# Patient Record
Sex: FEMALE | Race: ASIAN | Employment: FULL TIME | URBAN - METROPOLITAN AREA
[De-identification: names, ages, dates, MRNs, and addresses within clinical notes are randomized per-mention and may not be internally consistent; named-entity substitution may affect disease eponyms.]

---

## 2017-01-03 ENCOUNTER — OFFICE VISIT (OUTPATIENT)
Dept: INTERNAL MEDICINE CLINIC | Facility: CLINIC | Age: 42
End: 2017-01-03

## 2017-01-03 VITALS
RESPIRATION RATE: 14 BRPM | SYSTOLIC BLOOD PRESSURE: 120 MMHG | OXYGEN SATURATION: 98 % | HEIGHT: 63 IN | HEART RATE: 77 BPM | WEIGHT: 140 LBS | DIASTOLIC BLOOD PRESSURE: 72 MMHG | BODY MASS INDEX: 24.8 KG/M2

## 2017-01-03 DIAGNOSIS — D50.0 IRON DEFICIENCY ANEMIA DUE TO CHRONIC BLOOD LOSS: Primary | ICD-10-CM

## 2017-01-03 PROCEDURE — 99396 PREV VISIT EST AGE 40-64: CPT | Performed by: INTERNAL MEDICINE

## 2017-01-03 RX ORDER — ACETAMINOPHEN 160 MG
2000 TABLET,DISINTEGRATING ORAL DAILY
COMMUNITY

## 2017-01-03 NOTE — PROGRESS NOTES
[place] Debbie Mendoza [place]  10/5/1975 is a 42year old female. Patient presents with:  Physical      HPI:   See below    Current Outpatient Prescriptions:  aspirin 81 MG Oral Tab Take 81 mg by mouth daily.  Disp:  Rfl:    Vitamin D3 2000 UNITS Oral Cap T difficulty swallowing, change in stools, heartburn, nausea, vomiting no weight changes noted no heart burn noted--occ belching noted. Hematology:   Patient denies abnormal bleeding, easy bleeding, easy bruising. Enlarged lymph nodes none.    Genitourinary non-enlarged. Rebound tenderness: absent. Tenderness: absent . EXTREMITIES:   Clubbing: none. Cyanosis: absent . Edema: none. Pulses: present, bilateral.   Tremors: no.   Varicose veins: not present.    MUSCULOSKELETAL:   Cervical spines: normal

## 2017-01-13 RX ORDER — ATORVASTATIN CALCIUM 10 MG/1
TABLET, FILM COATED ORAL
Qty: 30 TABLET | Refills: 0 | Status: SHIPPED | OUTPATIENT
Start: 2017-01-13 | End: 2017-01-15

## 2017-01-16 RX ORDER — ATORVASTATIN CALCIUM 10 MG/1
TABLET, FILM COATED ORAL
Qty: 30 TABLET | Refills: 0 | Status: SHIPPED | OUTPATIENT
Start: 2017-01-16 | End: 2017-03-23

## 2017-03-23 RX ORDER — ATORVASTATIN CALCIUM 10 MG/1
TABLET, FILM COATED ORAL
Qty: 30 TABLET | Refills: 3 | Status: SHIPPED | OUTPATIENT
Start: 2017-03-23 | End: 2017-07-07

## 2017-04-05 DIAGNOSIS — D50.0 IRON DEFICIENCY ANEMIA DUE TO CHRONIC BLOOD LOSS: ICD-10-CM

## 2017-04-05 DIAGNOSIS — E78.00 PURE HYPERCHOLESTEROLEMIA: Primary | ICD-10-CM

## 2017-04-20 RX ORDER — ATORVASTATIN CALCIUM 10 MG/1
TABLET, FILM COATED ORAL
Qty: 30 TABLET | Refills: 0 | OUTPATIENT
Start: 2017-04-20

## 2017-07-07 RX ORDER — ATORVASTATIN CALCIUM 10 MG/1
TABLET, FILM COATED ORAL
Qty: 30 TABLET | Refills: 2 | Status: SHIPPED | OUTPATIENT
Start: 2017-07-07 | End: 2017-10-18

## 2017-08-15 ENCOUNTER — OFFICE VISIT (OUTPATIENT)
Dept: INTERNAL MEDICINE CLINIC | Facility: CLINIC | Age: 42
End: 2017-08-15

## 2017-08-15 ENCOUNTER — LAB ENCOUNTER (OUTPATIENT)
Dept: LAB | Age: 42
End: 2017-08-15
Attending: INTERNAL MEDICINE
Payer: COMMERCIAL

## 2017-08-15 VITALS
HEART RATE: 76 BPM | BODY MASS INDEX: 24.8 KG/M2 | RESPIRATION RATE: 16 BRPM | SYSTOLIC BLOOD PRESSURE: 114 MMHG | DIASTOLIC BLOOD PRESSURE: 70 MMHG | HEIGHT: 63 IN | WEIGHT: 140 LBS | TEMPERATURE: 98 F | OXYGEN SATURATION: 98 %

## 2017-08-15 DIAGNOSIS — R10.10 PAIN OF UPPER ABDOMEN: Primary | ICD-10-CM

## 2017-08-15 LAB
ALBUMIN SERPL-MCNC: 3.8 G/DL (ref 3.5–4.8)
ALP LIVER SERPL-CCNC: 47 U/L (ref 37–98)
ALT SERPL-CCNC: 38 U/L (ref 14–54)
AST SERPL-CCNC: 18 U/L (ref 15–41)
BASOPHILS # BLD AUTO: 0.09 X10(3) UL (ref 0–0.1)
BASOPHILS NFR BLD AUTO: 1 %
BILIRUB SERPL-MCNC: 0.4 MG/DL (ref 0.1–2)
BILIRUB UR QL STRIP.AUTO: NEGATIVE
BUN BLD-MCNC: 13 MG/DL (ref 8–20)
CALCIUM BLD-MCNC: 8.8 MG/DL (ref 8.3–10.3)
CHLORIDE: 108 MMOL/L (ref 101–111)
CO2: 26 MMOL/L (ref 22–32)
COLOR UR AUTO: YELLOW
CREAT BLD-MCNC: 0.81 MG/DL (ref 0.55–1.02)
EOSINOPHIL # BLD AUTO: 0.33 X10(3) UL (ref 0–0.3)
EOSINOPHIL NFR BLD AUTO: 3.8 %
ERYTHROCYTE [DISTWIDTH] IN BLOOD BY AUTOMATED COUNT: 12.7 % (ref 11.5–16)
GLUCOSE BLD-MCNC: 82 MG/DL (ref 70–99)
GLUCOSE UR STRIP.AUTO-MCNC: NEGATIVE MG/DL
HCT VFR BLD AUTO: 40.9 % (ref 34–50)
HGB BLD-MCNC: 13.3 G/DL (ref 12–16)
IMMATURE GRANULOCYTE COUNT: 0.02 X10(3) UL (ref 0–1)
IMMATURE GRANULOCYTE RATIO %: 0.2 %
KETONES UR STRIP.AUTO-MCNC: NEGATIVE MG/DL
LEUKOCYTE ESTERASE UR QL STRIP.AUTO: NEGATIVE
LIPASE: 344 U/L (ref 73–393)
LYMPHOCYTES # BLD AUTO: 2.17 X10(3) UL (ref 0.9–4)
LYMPHOCYTES NFR BLD AUTO: 24.7 %
M PROTEIN MFR SERPL ELPH: 7.3 G/DL (ref 6.1–8.3)
MCH RBC QN AUTO: 27 PG (ref 27–33.2)
MCHC RBC AUTO-ENTMCNC: 32.5 G/DL (ref 31–37)
MCV RBC AUTO: 83.1 FL (ref 81–100)
MONOCYTES # BLD AUTO: 0.88 X10(3) UL (ref 0.1–0.6)
MONOCYTES NFR BLD AUTO: 10 %
NEUTROPHIL ABS PRELIM: 5.28 X10 (3) UL (ref 1.3–6.7)
NEUTROPHILS # BLD AUTO: 5.28 X10(3) UL (ref 1.3–6.7)
NEUTROPHILS NFR BLD AUTO: 60.3 %
NITRITE UR QL STRIP.AUTO: NEGATIVE
PH UR STRIP.AUTO: 7 [PH] (ref 4.5–8)
PLATELET # BLD AUTO: 139 10(3)UL (ref 150–450)
POTASSIUM SERPL-SCNC: 3.6 MMOL/L (ref 3.6–5.1)
PROT UR STRIP.AUTO-MCNC: NEGATIVE MG/DL
RBC # BLD AUTO: 4.92 X10(6)UL (ref 3.8–5.1)
RBC UR QL AUTO: NEGATIVE
RED CELL DISTRIBUTION WIDTH-SD: 38.4 FL (ref 35.1–46.3)
SODIUM SERPL-SCNC: 143 MMOL/L (ref 136–144)
SP GR UR STRIP.AUTO: 1.02 (ref 1–1.03)
UROBILINOGEN UR STRIP.AUTO-MCNC: <2 MG/DL
WBC # BLD AUTO: 8.8 X10(3) UL (ref 4–13)

## 2017-08-15 PROCEDURE — 81003 URINALYSIS AUTO W/O SCOPE: CPT | Performed by: INTERNAL MEDICINE

## 2017-08-15 PROCEDURE — 99213 OFFICE O/P EST LOW 20 MIN: CPT | Performed by: INTERNAL MEDICINE

## 2017-08-15 PROCEDURE — 83690 ASSAY OF LIPASE: CPT | Performed by: INTERNAL MEDICINE

## 2017-08-15 PROCEDURE — 80053 COMPREHEN METABOLIC PANEL: CPT | Performed by: INTERNAL MEDICINE

## 2017-08-15 PROCEDURE — 36415 COLL VENOUS BLD VENIPUNCTURE: CPT | Performed by: INTERNAL MEDICINE

## 2017-08-15 PROCEDURE — 85025 COMPLETE CBC W/AUTO DIFF WBC: CPT | Performed by: INTERNAL MEDICINE

## 2017-08-15 RX ORDER — PANTOPRAZOLE SODIUM 40 MG/1
40 TABLET, DELAYED RELEASE ORAL
Qty: 30 TABLET | Refills: 11 | Status: SHIPPED | OUTPATIENT
Start: 2017-08-15 | End: 2017-09-01

## 2017-08-15 NOTE — PROGRESS NOTES
Maren Mendoza [place]  10/5/1975 is a 42year old female.     Patient presents with:  Abdominal Pain      HPI:   Abdominal pain:   The abdominal pain began few days   The severity is mild  The abdominal pain is described as gassy  The abdominal pain oc /70 (BP Location: Left arm, Patient Position: Sitting, Cuff Size: adult)   Pulse 76   Temp 98.2 °F (36.8 °C) (Oral)   Resp 16   Ht 63\"   Wt 140 lb   SpO2 98%   BMI 24.80 kg/m²     HEENT:   Ear canals: normal.   Ear drums: normal .   Ears: unremark

## 2017-08-16 ENCOUNTER — HOSPITAL ENCOUNTER (OUTPATIENT)
Dept: ULTRASOUND IMAGING | Age: 42
Discharge: HOME | End: 2017-08-16
Attending: INTERNAL MEDICINE
Payer: COMMERCIAL

## 2017-08-16 DIAGNOSIS — R10.10 PAIN OF UPPER ABDOMEN: ICD-10-CM

## 2017-08-16 PROCEDURE — 76700 US EXAM ABDOM COMPLETE: CPT | Performed by: INTERNAL MEDICINE

## 2017-08-18 ENCOUNTER — OFFICE VISIT (OUTPATIENT)
Dept: INTERNAL MEDICINE CLINIC | Facility: CLINIC | Age: 42
End: 2017-08-18

## 2017-08-18 VITALS
WEIGHT: 140 LBS | HEIGHT: 63 IN | BODY MASS INDEX: 24.8 KG/M2 | SYSTOLIC BLOOD PRESSURE: 114 MMHG | HEART RATE: 76 BPM | DIASTOLIC BLOOD PRESSURE: 72 MMHG | OXYGEN SATURATION: 98 % | TEMPERATURE: 98 F | RESPIRATION RATE: 16 BRPM

## 2017-08-18 DIAGNOSIS — R10.13 EPIGASTRIC PAIN: Primary | ICD-10-CM

## 2017-08-18 DIAGNOSIS — D69.6 THROMBOCYTOPENIA (HCC): ICD-10-CM

## 2017-08-18 PROCEDURE — 99213 OFFICE O/P EST LOW 20 MIN: CPT | Performed by: INTERNAL MEDICINE

## 2017-08-18 NOTE — PROGRESS NOTES
Gabriel Mendoza [place]  10/5/1975 is a 42year old female. Patient presents with:   Follow - Up      HPI:   Feels a lot better but still having discomfort    Current Outpatient Prescriptions:  Pantoprazole Sodium 40 MG Oral Tab EC Take 1 tablet (40 m

## 2017-08-21 ENCOUNTER — OFFICE VISIT (OUTPATIENT)
Dept: SURGERY | Facility: CLINIC | Age: 42
End: 2017-08-21

## 2017-08-21 VITALS — RESPIRATION RATE: 18 BRPM | WEIGHT: 142 LBS | TEMPERATURE: 99 F | BODY MASS INDEX: 25 KG/M2 | HEART RATE: 60 BPM

## 2017-08-21 DIAGNOSIS — R10.10 UPPER ABDOMINAL PAIN OF UNKNOWN ETIOLOGY: Primary | ICD-10-CM

## 2017-08-21 PROCEDURE — 99203 OFFICE O/P NEW LOW 30 MIN: CPT | Performed by: SURGERY

## 2017-08-21 NOTE — H&P
New Patient Visit Note       Active Problems      1.  Upper abdominal pain of unknown etiology        Chief Complaint   Patient presents with:  Abdominal Pain: new pt ref by  for Gallbladder consult       History of Present Illness     Pt seen at the req Oral Tab CR Take 160 mg by mouth daily. Disp: 180 tablet Rfl: 3   aspirin 81 MG Oral Tab Take 81 mg by mouth daily. Disp:  Rfl:    Vitamin D3 2000 UNITS Oral Cap Take 2,000 Units by mouth daily.  Disp:  Rfl:          Review of Systems  The Review of Systems encounter diagnosis)    Pt with upper abdominal pain of unclear etiology. ? Biliary dyskinesia versus GERD. Plan   · Lengthy discussion with the patient and her .   · Explained that a gallbladder polyp would not be the source of the patient's rachelle

## 2017-08-24 ENCOUNTER — HOSPITAL ENCOUNTER (OUTPATIENT)
Dept: NUCLEAR MEDICINE | Facility: HOSPITAL | Age: 42
Discharge: HOME | End: 2017-08-24
Attending: SURGERY
Payer: COMMERCIAL

## 2017-08-24 DIAGNOSIS — R10.10 UPPER ABDOMINAL PAIN OF UNKNOWN ETIOLOGY: ICD-10-CM

## 2017-08-24 PROCEDURE — 78227 HEPATOBIL SYST IMAGE W/DRUG: CPT | Performed by: SURGERY

## 2017-08-31 ENCOUNTER — LABORATORY ENCOUNTER (OUTPATIENT)
Dept: LAB | Age: 42
End: 2017-08-31
Attending: SURGERY
Payer: COMMERCIAL

## 2017-08-31 DIAGNOSIS — R10.13 EPIGASTRIC PAIN: Primary | ICD-10-CM

## 2017-08-31 PROCEDURE — 88342 IMHCHEM/IMCYTCHM 1ST ANTB: CPT

## 2017-08-31 PROCEDURE — 88305 TISSUE EXAM BY PATHOLOGIST: CPT

## 2017-09-01 ENCOUNTER — TELEPHONE (OUTPATIENT)
Dept: SURGERY | Facility: CLINIC | Age: 42
End: 2017-09-01

## 2017-09-01 DIAGNOSIS — R10.10 PAIN OF UPPER ABDOMEN: ICD-10-CM

## 2017-09-05 DIAGNOSIS — R10.10 PAIN OF UPPER ABDOMEN: ICD-10-CM

## 2017-09-05 RX ORDER — PANTOPRAZOLE SODIUM 40 MG/1
40 TABLET, DELAYED RELEASE ORAL
Qty: 30 TABLET | Refills: 5
Start: 2017-09-05 | End: 2018-09-05

## 2017-09-05 RX ORDER — PANTOPRAZOLE SODIUM 40 MG/1
40 TABLET, DELAYED RELEASE ORAL
Qty: 30 TABLET | Refills: 5 | Status: SHIPPED
Start: 2017-09-05 | End: 2018-02-13 | Stop reason: ALTCHOICE

## 2017-09-05 NOTE — TELEPHONE ENCOUNTER
From: Amanda Andino  Sent: 9/5/2017 11:50 AM CDT  Subject: Medication Renewal Request    Daja MASON Dasley would like a refill of the following medications:  Pantoprazole Sodium 40 MG Oral Tab EC Cassandra Mcgrath MD]    Preferred pharmacy: [id number] [address], [address] [phone], [phone]    Comment: This message for Dr. Fara White or Dr. Kay Cano, regarding the pantoprazole; frequency was changed to BID per Dr. Kay Cano can it be change because pharmacy wont dispense it not until Thursday.  Thank you

## 2017-09-05 NOTE — TELEPHONE ENCOUNTER
From: Tone Ellis  Sent: 9/1/2017 5:38 PM CDT  Subject: Medication Renewal Request    Daja Chaudhary would like a refill of the following medications:  Pantoprazole Sodium 40 MG Oral Tab EC Mine Jay MD]    Preferred [facility]: [facility]

## 2017-09-08 ENCOUNTER — OFFICE VISIT (OUTPATIENT)
Dept: SURGERY | Facility: CLINIC | Age: 42
End: 2017-09-08

## 2017-09-08 VITALS
HEART RATE: 69 BPM | HEIGHT: 62 IN | WEIGHT: 145 LBS | SYSTOLIC BLOOD PRESSURE: 121 MMHG | BODY MASS INDEX: 26.68 KG/M2 | TEMPERATURE: 98 F | DIASTOLIC BLOOD PRESSURE: 85 MMHG

## 2017-09-08 DIAGNOSIS — K80.10 CALCULUS OF GALLBLADDER WITH CHOLECYSTITIS WITHOUT BILIARY OBSTRUCTION, UNSPECIFIED CHOLECYSTITIS ACUITY: ICD-10-CM

## 2017-09-08 DIAGNOSIS — K82.8 BILIARY DYSKINESIA: Primary | ICD-10-CM

## 2017-09-08 PROCEDURE — 99213 OFFICE O/P EST LOW 20 MIN: CPT | Performed by: SURGERY

## 2017-09-08 NOTE — PROGRESS NOTES
Post Operative Visit Note       Active Problems  1. Biliary dyskinesia    2.  Calculus of gallbladder with cholecystitis without biliary obstruction, unspecified cholecystitis acuity         Chief Complaint   Patient presents with:  Post-Op: 8/31 EGD, here 81 mg by mouth daily. Disp:  Rfl:          Review of Systems  The Review of Systems has been reviewed by me during today. Review of Systems   Constitutional: Negative for chills, diaphoresis, fatigue, fever and unexpected weight change.    HENT: Negative f · Etiology and treatment options for biliary dyskinesia discussed with the patient and her . · Plan for a laparoscopic cholecystectomy with cholangiogram at [facility] on 9/27/17. · Procedure discussed with risks, benefits, alternatives.   ·

## 2017-09-08 NOTE — PROGRESS NOTES
Follow Up Visit Note       Active Problems      1.  Calculus of gallbladder with cholecystitis without biliary obstruction, unspecified cholecystitis acuity          Chief Complaint   Patient presents with:  Post-Op: 8/31 EGD, here for path results, protoni Constitutional: Negative for chills, diaphoresis, fatigue, fever and unexpected weight change. HENT: Negative for hearing loss, nosebleeds, sore throat and trouble swallowing. Respiratory: Negative for apnea, cough, shortness of breath and wheezing.

## 2017-09-11 RX ORDER — CHLORAL HYDRATE 500 MG
1000 CAPSULE ORAL DAILY
COMMUNITY

## 2017-09-16 LAB
ABSOLUTE BASOPHILS: 114 CELLS/UL (ref 0–200)
ABSOLUTE EOSINOPHILS: 352 CELLS/UL (ref 15–500)
ABSOLUTE LYMPHOCYTES: 2482 CELLS/UL (ref 850–3900)
ABSOLUTE MONOCYTES: 986 CELLS/UL (ref 200–950)
ABSOLUTE NEUTROPHILS: 4866 CELLS/UL (ref 1500–7800)
BASOPHILS: 1.3 %
EOSINOPHILS: 4 %
HEMATOCRIT: 39.1 % (ref 35–45)
HEMOGLOBIN: 13.4 G/DL (ref 11.7–15.5)
LYMPHOCYTES: 28.2 %
MCH: 27.7 PG (ref 27–33)
MCHC: 34.3 G/DL (ref 32–36)
MCV: 81 FL (ref 80–100)
MONOCYTES: 11.2 %
MPV: 13 FL (ref 7.5–12.5)
NEUTROPHILS: 55.3 %
PLATELET COUNT: 147 THOUSAND/UL (ref 140–400)
RDW: 12.4 % (ref 11–15)
RED BLOOD CELL COUNT: 4.83 MILLION/UL (ref 3.8–5.1)
WHITE BLOOD CELL COUNT: 8.8 THOUSAND/UL (ref 3.8–10.8)

## 2017-09-26 NOTE — H&P
Here for follow-up. EGD was negative. H pylori negative. HIDA scan showed a normal EF with reproduction of pain with CCK.      Allergies  Arturo Pastor is allergic to tetracycline.     Past Medical / Surgical / Social / Family History    The past medical and p Negative for apnea, cough, shortness of breath and wheezing. Cardiovascular: Negative for chest pain, palpitations and leg swelling.    Gastrointestinal: Negative for abdominal distention, abdominal pain, anal bleeding, blood in stool, constipation, diar conversion to open.   · All questions answered      Pre-op Diagnosis: Calculus of gallbladder with cholecystitis without biliary obstruction, unspecified cholecystitis acuity [K80.00]    The above referenced H&P was reviewed by Tirso Ware MD on 9/26/201

## 2017-09-27 ENCOUNTER — ANESTHESIA (OUTPATIENT)
Dept: SURGERY | Facility: HOSPITAL | Age: 42
End: 2017-09-27
Payer: COMMERCIAL

## 2017-09-27 ENCOUNTER — SURGERY (OUTPATIENT)
Age: 42
End: 2017-09-27

## 2017-09-27 ENCOUNTER — APPOINTMENT (OUTPATIENT)
Dept: GENERAL RADIOLOGY | Facility: HOSPITAL | Age: 42
End: 2017-09-27
Attending: SURGERY
Payer: COMMERCIAL

## 2017-09-27 ENCOUNTER — ANESTHESIA EVENT (OUTPATIENT)
Dept: SURGERY | Facility: HOSPITAL | Age: 42
End: 2017-09-27
Payer: COMMERCIAL

## 2017-09-27 ENCOUNTER — HOSPITAL ENCOUNTER (OUTPATIENT)
Facility: HOSPITAL | Age: 42
Setting detail: HOSPITAL OUTPATIENT SURGERY
Discharge: HOME | End: 2017-09-27
Attending: SURGERY | Admitting: SURGERY
Payer: COMMERCIAL

## 2017-09-27 VITALS
BODY MASS INDEX: 26.61 KG/M2 | WEIGHT: 144.63 LBS | TEMPERATURE: 98 F | HEIGHT: 62 IN | RESPIRATION RATE: 16 BRPM | HEART RATE: 67 BPM | DIASTOLIC BLOOD PRESSURE: 76 MMHG | SYSTOLIC BLOOD PRESSURE: 128 MMHG | OXYGEN SATURATION: 98 %

## 2017-09-27 DIAGNOSIS — K80.10 CALCULUS OF GALLBLADDER WITH CHOLECYSTITIS WITHOUT BILIARY OBSTRUCTION, UNSPECIFIED CHOLECYSTITIS ACUITY: ICD-10-CM

## 2017-09-27 LAB
POCT LOT NUMBER: NORMAL
POCT URINE PREGNANCY: NEGATIVE

## 2017-09-27 PROCEDURE — BF13YZZ FLUOROSCOPY OF GALLBLADDER AND BILE DUCTS USING OTHER CONTRAST: ICD-10-PCS | Performed by: SURGERY

## 2017-09-27 PROCEDURE — 88304 TISSUE EXAM BY PATHOLOGIST: CPT | Performed by: SURGERY

## 2017-09-27 PROCEDURE — 74300 X-RAY BILE DUCTS/PANCREAS: CPT | Performed by: SURGERY

## 2017-09-27 PROCEDURE — 0FT44ZZ RESECTION OF GALLBLADDER, PERCUTANEOUS ENDOSCOPIC APPROACH: ICD-10-PCS | Performed by: SURGERY

## 2017-09-27 PROCEDURE — 81025 URINE PREGNANCY TEST: CPT | Performed by: SURGERY

## 2017-09-27 RX ORDER — HYDROCODONE BITARTRATE AND ACETAMINOPHEN 5; 325 MG/1; MG/1
2 TABLET ORAL AS NEEDED
Status: COMPLETED | OUTPATIENT
Start: 2017-09-27 | End: 2017-09-27

## 2017-09-27 RX ORDER — HYDROCODONE BITARTRATE AND ACETAMINOPHEN 5; 325 MG/1; MG/1
1 TABLET ORAL AS NEEDED
Status: COMPLETED | OUTPATIENT
Start: 2017-09-27 | End: 2017-09-27

## 2017-09-27 RX ORDER — HYDROCODONE BITARTRATE AND ACETAMINOPHEN 5; 325 MG/1; MG/1
1 TABLET ORAL EVERY 4 HOURS PRN
Qty: 30 TABLET | Refills: 0 | Status: SHIPPED | OUTPATIENT
Start: 2017-09-27 | End: 2018-02-13 | Stop reason: ALTCHOICE

## 2017-09-27 RX ORDER — MIDAZOLAM HYDROCHLORIDE 1 MG/ML
1 INJECTION INTRAMUSCULAR; INTRAVENOUS EVERY 5 MIN PRN
Status: DISCONTINUED | OUTPATIENT
Start: 2017-09-27 | End: 2017-09-27

## 2017-09-27 RX ORDER — SODIUM CHLORIDE, SODIUM LACTATE, POTASSIUM CHLORIDE, CALCIUM CHLORIDE 600; 310; 30; 20 MG/100ML; MG/100ML; MG/100ML; MG/100ML
INJECTION, SOLUTION INTRAVENOUS CONTINUOUS
Status: DISCONTINUED | OUTPATIENT
Start: 2017-09-27 | End: 2017-09-27

## 2017-09-27 RX ORDER — HEPARIN SODIUM 5000 [USP'U]/ML
5000 INJECTION, SOLUTION INTRAVENOUS; SUBCUTANEOUS ONCE
Status: COMPLETED | OUTPATIENT
Start: 2017-09-27 | End: 2017-09-27

## 2017-09-27 RX ORDER — ONDANSETRON 2 MG/ML
4 INJECTION INTRAMUSCULAR; INTRAVENOUS AS NEEDED
Status: DISCONTINUED | OUTPATIENT
Start: 2017-09-27 | End: 2017-09-27

## 2017-09-27 RX ORDER — HYDROMORPHONE HYDROCHLORIDE 1 MG/ML
0.4 INJECTION, SOLUTION INTRAMUSCULAR; INTRAVENOUS; SUBCUTANEOUS EVERY 5 MIN PRN
Status: DISCONTINUED | OUTPATIENT
Start: 2017-09-27 | End: 2017-09-27

## 2017-09-27 RX ORDER — CEFOXITIN 2 G/1
INJECTION, POWDER, FOR SOLUTION INTRAVENOUS
Status: DISCONTINUED | OUTPATIENT
Start: 2017-09-27 | End: 2017-09-27 | Stop reason: HOSPADM

## 2017-09-27 RX ORDER — LABETALOL HYDROCHLORIDE 5 MG/ML
5 INJECTION, SOLUTION INTRAVENOUS EVERY 5 MIN PRN
Status: DISCONTINUED | OUTPATIENT
Start: 2017-09-27 | End: 2017-09-27

## 2017-09-27 RX ORDER — METOCLOPRAMIDE HYDROCHLORIDE 5 MG/ML
10 INJECTION INTRAMUSCULAR; INTRAVENOUS AS NEEDED
Status: DISCONTINUED | OUTPATIENT
Start: 2017-09-27 | End: 2017-09-27

## 2017-09-27 RX ORDER — MEPERIDINE HYDROCHLORIDE 25 MG/ML
12.5 INJECTION INTRAMUSCULAR; INTRAVENOUS; SUBCUTANEOUS AS NEEDED
Status: DISCONTINUED | OUTPATIENT
Start: 2017-09-27 | End: 2017-09-27

## 2017-09-27 RX ORDER — NALOXONE HYDROCHLORIDE 0.4 MG/ML
80 INJECTION, SOLUTION INTRAMUSCULAR; INTRAVENOUS; SUBCUTANEOUS AS NEEDED
Status: DISCONTINUED | OUTPATIENT
Start: 2017-09-27 | End: 2017-09-27

## 2017-09-27 RX ORDER — BUPIVACAINE HYDROCHLORIDE AND EPINEPHRINE 5; 5 MG/ML; UG/ML
INJECTION, SOLUTION EPIDURAL; INTRACAUDAL; PERINEURAL AS NEEDED
Status: DISCONTINUED | OUTPATIENT
Start: 2017-09-27 | End: 2017-09-27 | Stop reason: HOSPADM

## 2017-09-27 RX ORDER — HYDROMORPHONE HYDROCHLORIDE 1 MG/ML
INJECTION, SOLUTION INTRAMUSCULAR; INTRAVENOUS; SUBCUTANEOUS
Status: COMPLETED
Start: 2017-09-27 | End: 2017-09-27

## 2017-09-27 NOTE — OPERATIVE REPORT
PREOPERATIVE DIAGNOSIS: Bilary dyskinesia. POSTOPERATIVE DIAGNOSIS: Biliary dyskinesia.      PROCEDURE PERFORMED: Laparoscopic cholecystectomy with intraoperative cholangiogram.     SURGEON: Mariam Calixto MD       ASSISTANT: Yumiko Fox PA-C (Ms. of contrast into the duodenum, common duct, and hepatic radicles. Then, 2 clips were then placed on the proximal aspect of the duct and the duct was transected with scissors. In a similar fashion, the cystic artery was identified, clipped and divided.  The

## 2017-09-27 NOTE — ANESTHESIA PREPROCEDURE EVALUATION
PRE-OP EVALUATION    Patient Name: Odette GALO    Pre-op Diagnosis: Calculus of gallbladder with cholecystitis without biliary obstruction, unspecified cholecystitis acuity [K80.00]    Procedure(s):  [address] [place] [address] pain of unknown etiology     Biliary dyskinesia          Past Surgical History:  No date: D & C     Smoking status: Never Smoker    Smokeless tobacco: Never Used    Alcohol use Yes    Comment: social       Drug use: No     Available pre-op labs reviewed.

## 2017-09-27 NOTE — ANESTHESIA POSTPROCEDURE EVALUATION
Cindy [id number] Patient Status:  Hospital Outpatient Surgery   Age/Gender 42year old female MRN [id number]   [facility] SURGERY Attending Mann Olivera MD   Hosp Day # 0 PCP Montserrat Brower MD       Anesthesia Post

## 2017-10-06 ENCOUNTER — OFFICE VISIT (OUTPATIENT)
Dept: SURGERY | Facility: CLINIC | Age: 42
End: 2017-10-06

## 2017-10-06 VITALS
HEART RATE: 70 BPM | SYSTOLIC BLOOD PRESSURE: 109 MMHG | DIASTOLIC BLOOD PRESSURE: 69 MMHG | HEIGHT: 62 IN | WEIGHT: 147.19 LBS | TEMPERATURE: 98 F | BODY MASS INDEX: 27.08 KG/M2

## 2017-10-06 DIAGNOSIS — K82.8 BILIARY DYSKINESIA: Primary | ICD-10-CM

## 2017-10-06 PROCEDURE — 99024 POSTOP FOLLOW-UP VISIT: CPT | Performed by: SURGERY

## 2017-10-06 NOTE — PROGRESS NOTES
Post Operative Visit Note       Active Problems  1. Biliary dyskinesia         Chief Complaint   Patient presents with:  Post-Op: 1st PO LAPAROSCOPIC CHOLECYSTECTOMY WITH CHOLANGIOGRAM on 9/27 @. C/O pain/discomfort. Pt denies any discharge or bleeding. Sulfate Dried ER (SLOW FE) 160 (50 Fe) MG Oral Tab CR Take 160 mg by mouth daily. Disp: 180 tablet Rfl: 3   aspirin 81 MG Oral Tab Take 81 mg by mouth daily. Disp:  Rfl:    Vitamin D3 2000 UNITS Oral Cap Take 2,000 Units by mouth daily.  Disp:  Rfl: procedure and pathology results d/w patient and her . · Resume regular activity. · RTW on 10/11/17. · Follow-up as needed. · All questions answered. No orders of the defined types were placed in this encounter.       Imaging & Referrals

## 2017-10-20 RX ORDER — ATORVASTATIN CALCIUM 10 MG/1
10 TABLET, FILM COATED ORAL DAILY
Qty: 30 TABLET | Refills: 2 | Status: SHIPPED
Start: 2017-10-20 | End: 2018-01-22

## 2017-10-20 NOTE — TELEPHONE ENCOUNTER
From: Ghulam Andino  Sent: 10/18/2017 8:30 PM CDT  Subject: Medication Renewal Request    Daja Dominguez would like a refill of the following medications:     ATORVASTATIN 10 MG Oral Tab Rachel Mariano MD]    Preferred pharmacy: [facility]

## 2018-01-23 RX ORDER — ATORVASTATIN CALCIUM 10 MG/1
TABLET, FILM COATED ORAL
Qty: 90 TABLET | Refills: 1 | Status: SHIPPED | OUTPATIENT
Start: 2018-01-23 | End: 2018-07-07

## 2018-02-05 ENCOUNTER — TELEPHONE (OUTPATIENT)
Dept: INTERNAL MEDICINE CLINIC | Facility: CLINIC | Age: 43
End: 2018-02-05

## 2018-02-05 DIAGNOSIS — E78.00 PURE HYPERCHOLESTEROLEMIA: ICD-10-CM

## 2018-02-05 DIAGNOSIS — Z00.00 BLOOD TESTS FOR ROUTINE GENERAL PHYSICAL EXAMINATION: Primary | ICD-10-CM

## 2018-02-05 NOTE — TELEPHONE ENCOUNTER
Patient has an appointment on 02/09/18, and is requesting her lab work orders to be inputted in prior to her appointment.     Lab Preference: [facility]

## 2018-02-07 ENCOUNTER — APPOINTMENT (OUTPATIENT)
Dept: LAB | Facility: HOSPITAL | Age: 43
End: 2018-02-07
Attending: INTERNAL MEDICINE
Payer: COMMERCIAL

## 2018-02-07 LAB
25-HYDROXYVITAMIN D (TOTAL): 41.1 NG/ML (ref 30–100)
ALBUMIN SERPL-MCNC: 4.1 G/DL (ref 3.5–4.8)
ALP LIVER SERPL-CCNC: 52 U/L (ref 37–98)
ALT SERPL-CCNC: 34 U/L (ref 14–54)
AST SERPL-CCNC: 19 U/L (ref 15–41)
BASOPHILS # BLD AUTO: 0.07 X10(3) UL (ref 0–0.1)
BASOPHILS NFR BLD AUTO: 1.1 %
BILIRUB SERPL-MCNC: 0.7 MG/DL (ref 0.1–2)
BILIRUB UR QL STRIP.AUTO: NEGATIVE
BUN BLD-MCNC: 11 MG/DL (ref 8–20)
CALCIUM BLD-MCNC: 8.8 MG/DL (ref 8.3–10.3)
CHLORIDE: 106 MMOL/L (ref 101–111)
CHOLEST SMN-MCNC: 151 MG/DL (ref ?–200)
CLARITY UR REFRACT.AUTO: CLEAR
CO2: 27 MMOL/L (ref 22–32)
COLOR UR AUTO: YELLOW
CREAT BLD-MCNC: 0.66 MG/DL (ref 0.55–1.02)
EOSINOPHIL # BLD AUTO: 0.25 X10(3) UL (ref 0–0.3)
EOSINOPHIL NFR BLD AUTO: 3.9 %
ERYTHROCYTE [DISTWIDTH] IN BLOOD BY AUTOMATED COUNT: 12.9 % (ref 11.5–16)
EST. AVERAGE GLUCOSE BLD GHB EST-MCNC: 111 MG/DL (ref 68–126)
GLUCOSE BLD-MCNC: 82 MG/DL (ref 70–99)
GLUCOSE UR STRIP.AUTO-MCNC: NEGATIVE MG/DL
HBA1C MFR BLD HPLC: 5.5 % (ref ?–5.7)
HCT VFR BLD AUTO: 45.3 % (ref 34–50)
HDLC SERPL-MCNC: 73 MG/DL (ref 45–?)
HDLC SERPL: 2.07 {RATIO} (ref ?–4.44)
HGB BLD-MCNC: 14.8 G/DL (ref 12–16)
IMMATURE GRANULOCYTE COUNT: 0.02 X10(3) UL (ref 0–1)
IMMATURE GRANULOCYTE RATIO %: 0.3 %
KETONES UR STRIP.AUTO-MCNC: NEGATIVE MG/DL
LDLC SERPL CALC-MCNC: 70 MG/DL (ref ?–130)
LEUKOCYTE ESTERASE UR QL STRIP.AUTO: NEGATIVE
LYMPHOCYTES # BLD AUTO: 1.82 X10(3) UL (ref 0.9–4)
LYMPHOCYTES NFR BLD AUTO: 28.3 %
M PROTEIN MFR SERPL ELPH: 7.8 G/DL (ref 6.1–8.3)
MCH RBC QN AUTO: 27 PG (ref 27–33.2)
MCHC RBC AUTO-ENTMCNC: 32.7 G/DL (ref 31–37)
MCV RBC AUTO: 82.7 FL (ref 81–100)
MONOCYTES # BLD AUTO: 0.53 X10(3) UL (ref 0.1–0.6)
MONOCYTES NFR BLD AUTO: 8.2 %
NEUTROPHIL ABS PRELIM: 3.75 X10 (3) UL (ref 1.3–6.7)
NEUTROPHILS # BLD AUTO: 3.75 X10(3) UL (ref 1.3–6.7)
NEUTROPHILS NFR BLD AUTO: 58.2 %
NITRITE UR QL STRIP.AUTO: NEGATIVE
NONHDLC SERPL-MCNC: 78 MG/DL (ref ?–130)
PH UR STRIP.AUTO: 6 [PH] (ref 4.5–8)
PLATELET # BLD AUTO: 151 10(3)UL (ref 150–450)
POTASSIUM SERPL-SCNC: 3.7 MMOL/L (ref 3.6–5.1)
PROT UR STRIP.AUTO-MCNC: NEGATIVE MG/DL
RBC # BLD AUTO: 5.48 X10(6)UL (ref 3.8–5.1)
RBC UR QL AUTO: NEGATIVE
RED CELL DISTRIBUTION WIDTH-SD: 38.8 FL (ref 35.1–46.3)
SODIUM SERPL-SCNC: 140 MMOL/L (ref 136–144)
SP GR UR STRIP.AUTO: 1.02 (ref 1–1.03)
THYROXINE (T4): 10 UG/DL (ref 4.5–10.9)
TRIGL SERPL-MCNC: 40 MG/DL (ref ?–150)
UROBILINOGEN UR STRIP.AUTO-MCNC: <2 MG/DL
VLDLC SERPL CALC-MCNC: 8 MG/DL (ref 5–40)
WBC # BLD AUTO: 6.4 X10(3) UL (ref 4–13)

## 2018-02-07 PROCEDURE — 80061 LIPID PANEL: CPT | Performed by: INTERNAL MEDICINE

## 2018-02-07 PROCEDURE — 81003 URINALYSIS AUTO W/O SCOPE: CPT | Performed by: INTERNAL MEDICINE

## 2018-02-07 PROCEDURE — 82306 VITAMIN D 25 HYDROXY: CPT | Performed by: INTERNAL MEDICINE

## 2018-02-07 PROCEDURE — 83036 HEMOGLOBIN GLYCOSYLATED A1C: CPT | Performed by: INTERNAL MEDICINE

## 2018-02-07 PROCEDURE — 80053 COMPREHEN METABOLIC PANEL: CPT | Performed by: INTERNAL MEDICINE

## 2018-02-07 PROCEDURE — 84436 ASSAY OF TOTAL THYROXINE: CPT | Performed by: INTERNAL MEDICINE

## 2018-02-07 PROCEDURE — 85025 COMPLETE CBC W/AUTO DIFF WBC: CPT | Performed by: INTERNAL MEDICINE

## 2018-02-13 ENCOUNTER — OFFICE VISIT (OUTPATIENT)
Dept: INTERNAL MEDICINE CLINIC | Facility: CLINIC | Age: 43
End: 2018-02-13

## 2018-02-13 VITALS
OXYGEN SATURATION: 94 % | RESPIRATION RATE: 16 BRPM | DIASTOLIC BLOOD PRESSURE: 80 MMHG | SYSTOLIC BLOOD PRESSURE: 132 MMHG | HEART RATE: 74 BPM | TEMPERATURE: 98 F | WEIGHT: 148 LBS | BODY MASS INDEX: 26.22 KG/M2 | HEIGHT: 63 IN

## 2018-02-13 DIAGNOSIS — Z00.00 ROUTINE GENERAL MEDICAL EXAMINATION AT A HEALTH CARE FACILITY: Primary | ICD-10-CM

## 2018-02-13 PROBLEM — D50.0 IRON DEFICIENCY ANEMIA DUE TO CHRONIC BLOOD LOSS: Status: RESOLVED | Noted: 2017-01-03 | Resolved: 2018-02-13

## 2018-02-13 PROBLEM — R10.10 UPPER ABDOMINAL PAIN OF UNKNOWN ETIOLOGY: Status: RESOLVED | Noted: 2017-08-21 | Resolved: 2018-02-13

## 2018-02-13 PROBLEM — K82.8 BILIARY DYSKINESIA: Status: RESOLVED | Noted: 2017-09-08 | Resolved: 2018-02-13

## 2018-02-13 PROCEDURE — 99396 PREV VISIT EST AGE 40-64: CPT | Performed by: INTERNAL MEDICINE

## 2018-02-13 NOTE — PROGRESS NOTES
Tres Mendoza [place]  10/5/1975 is a 36year old female.     Patient presents with:  Physical      HPI:   See below    Current Outpatient Prescriptions:  ATORVASTATIN 10 MG Oral Tab TAKE 1 TABLET BY MOUTH ONE TIME A DAY  Disp: 90 tablet Rfl: 1   omega dyspnea) none.  exercise yes  Gastrointestinal:   Patient denies abdominal pain, blood in stool, constipation, diarrhea, difficulty swallowing, change in stools, heartburn, nausea, vomiting no weight changes noted no heart burn noted- --s/p danii 9/17-after Rhythm: regular. LUNGS:   Auscultation: clear . Chest Shape: normal .   Percussion: normal.   Rales: no .   Respiratory effort: normal .   Rhonchi: no.   Wheezes: no. ABDOMEN:   General: normal.   Hernia: absent. Inguinal nodes: none.    Liver, Sp

## 2018-02-13 NOTE — PATIENT INSTRUCTIONS
Will review to treat about postcholecystectomy bowel alteration. May land up needing cholestyramine.   However will review records and get back to patient

## 2018-02-22 ENCOUNTER — TELEPHONE (OUTPATIENT)
Dept: INTERNAL MEDICINE CLINIC | Facility: CLINIC | Age: 43
End: 2018-02-22

## 2018-02-22 RX ORDER — CHOLESTYRAMINE 4 G/9G
2 POWDER, FOR SUSPENSION ORAL DAILY
Qty: 15 PACKET | Refills: 3 | Status: SHIPPED | OUTPATIENT
Start: 2018-02-22 | End: 2018-03-24

## 2018-02-22 NOTE — TELEPHONE ENCOUNTER
Patient no records reviewed after cholecystectomy a small percentage of people can get diarrhea which should resolve over.   Of time ,however if she wants she can get a trial of cholestyramine 2 g daily which can gradually be increased upwards if no better

## 2018-05-01 ENCOUNTER — HOSPITAL ENCOUNTER (OUTPATIENT)
Dept: GENERAL RADIOLOGY | Age: 43
Discharge: HOME | End: 2018-05-01
Attending: INTERNAL MEDICINE
Payer: COMMERCIAL

## 2018-05-01 ENCOUNTER — OFFICE VISIT (OUTPATIENT)
Dept: INTERNAL MEDICINE CLINIC | Facility: CLINIC | Age: 43
End: 2018-05-01

## 2018-05-01 VITALS
DIASTOLIC BLOOD PRESSURE: 60 MMHG | SYSTOLIC BLOOD PRESSURE: 100 MMHG | HEIGHT: 63 IN | TEMPERATURE: 99 F | OXYGEN SATURATION: 97 % | WEIGHT: 149 LBS | RESPIRATION RATE: 16 BRPM | HEART RATE: 77 BPM | BODY MASS INDEX: 26.4 KG/M2

## 2018-05-01 DIAGNOSIS — G89.29 CHRONIC PAIN OF RIGHT KNEE: Primary | ICD-10-CM

## 2018-05-01 DIAGNOSIS — M25.561 CHRONIC PAIN OF RIGHT KNEE: Primary | ICD-10-CM

## 2018-05-01 DIAGNOSIS — M25.561 CHRONIC PAIN OF RIGHT KNEE: ICD-10-CM

## 2018-05-01 DIAGNOSIS — G89.29 CHRONIC PAIN OF RIGHT KNEE: ICD-10-CM

## 2018-05-01 PROCEDURE — 99213 OFFICE O/P EST LOW 20 MIN: CPT | Performed by: INTERNAL MEDICINE

## 2018-05-01 PROCEDURE — 73560 X-RAY EXAM OF KNEE 1 OR 2: CPT | Performed by: INTERNAL MEDICINE

## 2018-05-01 NOTE — PROGRESS NOTES
Mony Lain Reji [place]  10/5/1975 is a 36year old female.     Patient presents with:  Knee Pain      HPI:    Knee:   Presents with c/o Pain in right knee especially on prolonged standing  Swelling neg  Locking and catching neg    Giving way sensation n today for knee pain. Diagnoses and all orders for this visit:    Chronic pain of right knee  -     XR KNEE (1 OR 2 VIEWS), RIGHT (CPT=73560); Future  -     Diclofenac Sodium 50 MG Oral Tab EC; Take 1 tablet (50 mg total) by mouth 2 (two) times daily.

## 2018-07-07 ENCOUNTER — OFFICE VISIT (OUTPATIENT)
Dept: INTERNAL MEDICINE CLINIC | Facility: CLINIC | Age: 43
End: 2018-07-07

## 2018-07-07 VITALS
DIASTOLIC BLOOD PRESSURE: 70 MMHG | SYSTOLIC BLOOD PRESSURE: 110 MMHG | HEIGHT: 63 IN | HEART RATE: 64 BPM | BODY MASS INDEX: 26.89 KG/M2 | RESPIRATION RATE: 16 BRPM | TEMPERATURE: 99 F | WEIGHT: 151.75 LBS

## 2018-07-07 DIAGNOSIS — R12 HEARTBURN: Primary | ICD-10-CM

## 2018-07-07 DIAGNOSIS — K29.60 BILIARY GASTRITIS: ICD-10-CM

## 2018-07-07 LAB
ALBUMIN SERPL-MCNC: 4 G/DL (ref 3.5–4.8)
ALP LIVER SERPL-CCNC: 54 U/L (ref 37–98)
ALT SERPL-CCNC: 39 U/L (ref 14–54)
AST SERPL-CCNC: 21 U/L (ref 15–41)
BASOPHILS # BLD AUTO: 0.12 X10(3) UL (ref 0–0.1)
BASOPHILS NFR BLD AUTO: 1.4 %
BILIRUB SERPL-MCNC: 0.4 MG/DL (ref 0.1–2)
BUN BLD-MCNC: 11 MG/DL (ref 8–20)
CALCIUM BLD-MCNC: 8.7 MG/DL (ref 8.3–10.3)
CHLORIDE: 108 MMOL/L (ref 101–111)
CO2: 24 MMOL/L (ref 22–32)
CREAT BLD-MCNC: 0.66 MG/DL (ref 0.55–1.02)
EOSINOPHIL # BLD AUTO: 0.35 X10(3) UL (ref 0–0.3)
EOSINOPHIL NFR BLD AUTO: 4.1 %
ERYTHROCYTE [DISTWIDTH] IN BLOOD BY AUTOMATED COUNT: 13.2 % (ref 11.5–16)
GLUCOSE BLD-MCNC: 79 MG/DL (ref 70–99)
HCT VFR BLD AUTO: 44.2 % (ref 34–50)
HGB BLD-MCNC: 13.9 G/DL (ref 12–16)
IMMATURE GRANULOCYTE COUNT: 0.02 X10(3) UL (ref 0–1)
IMMATURE GRANULOCYTE RATIO %: 0.2 %
LIPASE: 239 U/L (ref 73–393)
LYMPHOCYTES # BLD AUTO: 2.37 X10(3) UL (ref 0.9–4)
LYMPHOCYTES NFR BLD AUTO: 27.5 %
M PROTEIN MFR SERPL ELPH: 7.9 G/DL (ref 6.1–8.3)
MCH RBC QN AUTO: 26.5 PG (ref 27–33.2)
MCHC RBC AUTO-ENTMCNC: 31.4 G/DL (ref 31–37)
MCV RBC AUTO: 84.4 FL (ref 81–100)
MONOCYTES # BLD AUTO: 0.9 X10(3) UL (ref 0.1–1)
MONOCYTES NFR BLD AUTO: 10.5 %
NEUTROPHIL ABS PRELIM: 4.85 X10 (3) UL (ref 1.3–6.7)
NEUTROPHILS # BLD AUTO: 4.85 X10(3) UL (ref 1.3–6.7)
NEUTROPHILS NFR BLD AUTO: 56.3 %
PLATELET # BLD AUTO: 180 10(3)UL (ref 150–450)
POTASSIUM SERPL-SCNC: 3.9 MMOL/L (ref 3.6–5.1)
RBC # BLD AUTO: 5.24 X10(6)UL (ref 3.8–5.1)
RED CELL DISTRIBUTION WIDTH-SD: 40.9 FL (ref 35.1–46.3)
SODIUM SERPL-SCNC: 141 MMOL/L (ref 136–144)
WBC # BLD AUTO: 8.6 X10(3) UL (ref 4–13)

## 2018-07-07 PROCEDURE — 99213 OFFICE O/P EST LOW 20 MIN: CPT | Performed by: INTERNAL MEDICINE

## 2018-07-07 PROCEDURE — 83690 ASSAY OF LIPASE: CPT | Performed by: INTERNAL MEDICINE

## 2018-07-07 PROCEDURE — 85025 COMPLETE CBC W/AUTO DIFF WBC: CPT | Performed by: INTERNAL MEDICINE

## 2018-07-07 PROCEDURE — 80053 COMPREHEN METABOLIC PANEL: CPT | Performed by: INTERNAL MEDICINE

## 2018-07-07 RX ORDER — PANTOPRAZOLE SODIUM 40 MG/1
40 TABLET, DELAYED RELEASE ORAL
Qty: 30 TABLET | Refills: 11 | Status: SHIPPED | OUTPATIENT
Start: 2018-07-07 | End: 2019-02-19

## 2018-07-07 RX ORDER — ATORVASTATIN CALCIUM 10 MG/1
TABLET, FILM COATED ORAL
Qty: 90 TABLET | Refills: 1 | Status: SHIPPED | OUTPATIENT
Start: 2018-07-07 | End: 2018-12-26

## 2018-07-07 NOTE — PROGRESS NOTES
Rosana Tirado Reji [place]  10/5/1975 is a 36year old female. Patient presents with:  Heartburn  Hand Pain: R hand joint pain       HPI:   Heartburn in the last few weeks symptoms are gotten worse after the gallbladder surgery.   Had symptoms even prior /70 (BP Location: Right arm, Patient Position: Sitting, Cuff Size: adult)   Pulse 64   Temp 98.6 °F (37 °C) (Oral)   Resp 16   Ht 63\"   Wt 151 lb 12 oz   LMP 06/15/2018   BMI 26.88 kg/m²     HEENT:   Ear canals: normal.   Ear drums: normal .   Ears: Symptoms of gastritis can include:  · Abdominal pain or bloating  · Loss of appetite  · Nausea or vomiting  · Vomiting blood or having black stools  · Feeling more tired than usual  An inflamed and irritated stomach lining is more likely to develop a sore © 9146-8198 [employer] [address]. [address] [facility], [address]. All rights reserved. This information is not intended as a substitute for professional medical care. Always follow your healthcare professional's instructions.         Did rev

## 2018-07-12 ENCOUNTER — APPOINTMENT (OUTPATIENT)
Dept: LAB | Age: 43
End: 2018-07-12
Attending: INTERNAL MEDICINE
Payer: COMMERCIAL

## 2018-07-12 PROCEDURE — 87338 HPYLORI STOOL AG IA: CPT | Performed by: INTERNAL MEDICINE

## 2018-07-18 ENCOUNTER — TELEPHONE (OUTPATIENT)
Dept: INTERNAL MEDICINE CLINIC | Facility: CLINIC | Age: 43
End: 2018-07-18

## 2018-12-27 RX ORDER — ATORVASTATIN CALCIUM 10 MG/1
TABLET, FILM COATED ORAL
Qty: 90 TABLET | Refills: 0 | Status: SHIPPED | OUTPATIENT
Start: 2018-12-27 | End: 2019-04-13

## 2018-12-27 NOTE — TELEPHONE ENCOUNTER
Refill requested:   Requested Prescriptions     Pending Prescriptions Disp Refills   • ATORVASTATIN 10 MG Oral Tab [Pharmacy Med Name: Atorvastatin Calcium Oral Tablet 10 MG] 90 tablet 0     Sig: TAKE 1 TABLET BY MOUTH ONE TIME A DAY         Passed protoco

## 2018-12-29 RX ORDER — ATORVASTATIN CALCIUM 10 MG/1
TABLET, FILM COATED ORAL
Qty: 90 TABLET | Refills: 0 | OUTPATIENT
Start: 2018-12-29

## 2019-02-19 ENCOUNTER — OFFICE VISIT (OUTPATIENT)
Dept: INTERNAL MEDICINE CLINIC | Facility: CLINIC | Age: 44
End: 2019-02-19
Payer: COMMERCIAL

## 2019-02-19 VITALS
TEMPERATURE: 98 F | WEIGHT: 142.25 LBS | DIASTOLIC BLOOD PRESSURE: 82 MMHG | OXYGEN SATURATION: 98 % | HEIGHT: 62 IN | HEART RATE: 82 BPM | SYSTOLIC BLOOD PRESSURE: 120 MMHG | BODY MASS INDEX: 26.18 KG/M2 | RESPIRATION RATE: 12 BRPM

## 2019-02-19 DIAGNOSIS — Z00.00 ROUTINE GENERAL MEDICAL EXAMINATION AT A HEALTH CARE FACILITY: Primary | ICD-10-CM

## 2019-02-19 PROBLEM — R12 HEARTBURN: Status: RESOLVED | Noted: 2018-07-07 | Resolved: 2019-02-19

## 2019-02-19 PROBLEM — K29.60 BILIARY GASTRITIS: Status: RESOLVED | Noted: 2018-07-07 | Resolved: 2019-02-19

## 2019-02-19 PROCEDURE — 99396 PREV VISIT EST AGE 40-64: CPT | Performed by: INTERNAL MEDICINE

## 2019-02-19 NOTE — PROGRESS NOTES
Yuliya Disla [facility]  10/5/1975 is a 39year old female.     Patient presents with:  Physical      HPI:   See below    Current Outpatient Medications:  ATORVASTATIN 10 MG Oral Tab TAKE 1 TABLET BY MOUTH ONE TIME A DAY  Disp: 90 tablet Rfl: 0   omega-3 in stools, heartburn, nausea, vomiting no weight changes noted no heart burn noted- --s/p cholalanna 9/17-after surgery the patient started developing some bowel movements especially after breakfast and  liquid --- the rest of the day the bowel movements are un Rhonchi: no.   Wheezes: no. ABDOMEN:   General: normal.   Hernia: absent. Inguinal nodes: none. Liver, Spleen: non-enlarged. Rebound tenderness: absent. Tenderness: absent . EXTREMITIES:   Clubbing: none. Cyanosis: absent . Edema: none.

## 2019-02-22 LAB
ABSOLUTE BASOPHILS: 104 CELLS/UL (ref 0–200)
ABSOLUTE EOSINOPHILS: 280 CELLS/UL (ref 15–500)
ABSOLUTE LYMPHOCYTES: 2424 CELLS/UL (ref 850–3900)
ABSOLUTE MONOCYTES: 712 CELLS/UL (ref 200–950)
ABSOLUTE NEUTROPHILS: 4480 CELLS/UL (ref 1500–7800)
ALBUMIN/GLOBULIN RATIO: 1.8 (CALC) (ref 1–2.5)
ALBUMIN: 4.4 G/DL (ref 3.6–5.1)
ALKALINE PHOSPHATASE: 38 U/L (ref 33–115)
ALT: 17 U/L (ref 6–29)
APPEARANCE: CLEAR
AST: 15 U/L (ref 10–30)
BASOPHILS: 1.3 %
BILIRUBIN, TOTAL: 0.6 MG/DL (ref 0.2–1.2)
BILIRUBIN: NEGATIVE
BUN: 11 MG/DL (ref 7–25)
CALCIUM: 8.7 MG/DL (ref 8.6–10.2)
CARBON DIOXIDE: 27 MMOL/L (ref 20–32)
CHLORIDE: 107 MMOL/L (ref 98–110)
CHOL/HDLC RATIO: 2.6 (CALC)
CHOLESTEROL, TOTAL: 164 MG/DL
COLOR: YELLOW
CREATININE: 0.56 MG/DL (ref 0.5–1.1)
EGFR IF AFRICN AM: 131 ML/MIN/1.73M2
EGFR IF NONAFRICN AM: 113 ML/MIN/1.73M2
EOSINOPHILS: 3.5 %
GLOBULIN: 2.5 G/DL (CALC) (ref 1.9–3.7)
GLUCOSE: 85 MG/DL (ref 65–99)
GLUCOSE: NEGATIVE
HDL CHOLESTEROL: 62 MG/DL
HEMATOCRIT: 39.7 % (ref 35–45)
HEMOGLOBIN A1C: 5.4 % OF TOTAL HGB
HEMOGLOBIN: 13.1 G/DL (ref 11.7–15.5)
KETONES: NEGATIVE
LDL-CHOLESTEROL: 88 MG/DL (CALC)
LEUKOCYTE ESTERASE: NEGATIVE
LYMPHOCYTES: 30.3 %
MCH: 27 PG (ref 27–33)
MCHC: 33 G/DL (ref 32–36)
MCV: 81.7 FL (ref 80–100)
MONOCYTES: 8.9 %
MPV: 13 FL (ref 7.5–12.5)
NEUTROPHILS: 56 %
NITRITE: NEGATIVE
NON-HDL CHOLESTEROL: 102 MG/DL (CALC)
OCCULT BLOOD: NEGATIVE
PH: 6 (ref 5–8)
PLATELET COUNT: 171 THOUSAND/UL (ref 140–400)
POTASSIUM: 3.7 MMOL/L (ref 3.5–5.3)
PROTEIN, TOTAL: 6.9 G/DL (ref 6.1–8.1)
PROTEIN: NEGATIVE
RDW: 12.8 % (ref 11–15)
RED BLOOD CELL COUNT: 4.86 MILLION/UL (ref 3.8–5.1)
SODIUM: 139 MMOL/L (ref 135–146)
SPECIFIC GRAVITY: 1.02 (ref 1–1.03)
T4 (THYROXINE), TOTAL: 8 MCG/DL (ref 5.1–11.9)
TRIGLYCERIDES: 49 MG/DL
TSH: 1.07 MIU/L
WHITE BLOOD CELL COUNT: 8 THOUSAND/UL (ref 3.8–10.8)

## 2019-04-15 RX ORDER — ATORVASTATIN CALCIUM 10 MG/1
TABLET, FILM COATED ORAL
Qty: 90 TABLET | Refills: 0 | Status: SHIPPED | OUTPATIENT
Start: 2019-04-15 | End: 2019-07-21

## 2019-04-15 NOTE — TELEPHONE ENCOUNTER
Protocol passed.      Medication(s) to Refill:   Requested Prescriptions     Pending Prescriptions Disp Refills   • ATORVASTATIN 10 MG Oral Tab [Pharmacy Med Name: Atorvastatin Calcium Oral Tablet 10 MG] 90 tablet 0     Sig: TAKE 1 TABLET BY MOUTH ONE TIME

## 2019-06-25 ENCOUNTER — OFFICE VISIT (OUTPATIENT)
Dept: INTERNAL MEDICINE CLINIC | Facility: CLINIC | Age: 44
End: 2019-06-25
Payer: COMMERCIAL

## 2019-06-25 VITALS
HEIGHT: 62 IN | DIASTOLIC BLOOD PRESSURE: 66 MMHG | BODY MASS INDEX: 26.13 KG/M2 | TEMPERATURE: 98 F | OXYGEN SATURATION: 99 % | WEIGHT: 142 LBS | HEART RATE: 73 BPM | RESPIRATION RATE: 12 BRPM | SYSTOLIC BLOOD PRESSURE: 102 MMHG

## 2019-06-25 DIAGNOSIS — G89.29 CHRONIC RIGHT-SIDED LOW BACK PAIN WITH RIGHT-SIDED SCIATICA: Primary | ICD-10-CM

## 2019-06-25 DIAGNOSIS — M54.41 CHRONIC RIGHT-SIDED LOW BACK PAIN WITH RIGHT-SIDED SCIATICA: Primary | ICD-10-CM

## 2019-06-25 PROCEDURE — 99214 OFFICE O/P EST MOD 30 MIN: CPT | Performed by: INTERNAL MEDICINE

## 2019-06-25 RX ORDER — METHYLPREDNISOLONE 4 MG/1
TABLET ORAL
Qty: 1 KIT | Refills: 0 | Status: SHIPPED | OUTPATIENT
Start: 2019-06-25 | End: 2019-10-15

## 2019-06-25 RX ORDER — CYCLOBENZAPRINE HCL 5 MG
5 TABLET ORAL NIGHTLY
Qty: 30 TABLET | Refills: 0 | Status: SHIPPED | OUTPATIENT
Start: 2019-06-25 | End: 2019-10-15

## 2019-06-25 NOTE — PATIENT INSTRUCTIONS
Back exercise sheet given. If no better patient will consider physical therapy.   Pending MRI pain service

## 2019-06-25 NOTE — PROGRESS NOTES
Edith Mendoza [place]  10/5/1975 is a 39year old female. Patient presents with:  Back Pain      HPI:   C/o of low back pain for few days. Patient has a history of long-standing low back pain.   However in the last few weeks symptoms gotten a littl no.   Genitourinary:   Loss of control of urine no. Recurrent Urinary Tract Infection (UTI) no . Abnormal menstrual bleeding No. Blood in urine no. Burning on urination no. Difficulty urinating no. Discharge none. Dysuria none. Flank pain no.  Frequent Nigh reviewed  Patient Instructions   Back exercise sheet given. If no better patient will consider physical therapy. Pending MRI pain service     The patient indicates understanding of these issues and agrees to the plan.   The patient is asked to return in R

## 2019-07-23 RX ORDER — ATORVASTATIN CALCIUM 10 MG/1
TABLET, FILM COATED ORAL
Qty: 90 TABLET | Refills: 0 | Status: SHIPPED | OUTPATIENT
Start: 2019-07-23 | End: 2019-10-15

## 2019-07-23 NOTE — TELEPHONE ENCOUNTER
Atorvastatin 10 mg    Last OV relevant to medication: 6-     Last refill date: 4-     When pt was asked to return for OV: 2 weeks     Upcoming appt/reason: none    Labs: 2- ( lipid, cmp, cbc)

## 2019-10-15 DIAGNOSIS — G89.29 CHRONIC RIGHT-SIDED LOW BACK PAIN WITH RIGHT-SIDED SCIATICA: ICD-10-CM

## 2019-10-15 DIAGNOSIS — M54.41 CHRONIC RIGHT-SIDED LOW BACK PAIN WITH RIGHT-SIDED SCIATICA: ICD-10-CM

## 2019-10-17 RX ORDER — METHYLPREDNISOLONE 4 MG/1
TABLET ORAL
Qty: 1 PACKAGE | Refills: 0 | Status: SHIPPED | OUTPATIENT
Start: 2019-10-17 | End: 2020-02-25 | Stop reason: ALTCHOICE

## 2019-10-17 RX ORDER — CYCLOBENZAPRINE HCL 5 MG
TABLET ORAL
Qty: 30 TABLET | Refills: 0 | Status: SHIPPED | OUTPATIENT
Start: 2019-10-17 | End: 2020-02-25 | Stop reason: ALTCHOICE

## 2019-10-17 RX ORDER — ATORVASTATIN CALCIUM 10 MG/1
TABLET, FILM COATED ORAL
Qty: 90 TABLET | Refills: 0 | Status: SHIPPED | OUTPATIENT
Start: 2019-10-17 | End: 2020-02-03

## 2019-10-17 NOTE — TELEPHONE ENCOUNTER
ATORVASTATIN 10 MG , CYCLOBENZAPRINE HCL 5 MG , and METHYLPREDNISOLONE 4 MG Oral     Last OV relevant to medication: 6-     Last refill QA:6- # 90 tabs with 0 refills     When pt was asked to return for OV: 2 weeks     Upcoming appt/reason:

## 2019-10-19 RX ORDER — ATORVASTATIN CALCIUM 10 MG/1
TABLET, FILM COATED ORAL
Qty: 90 TABLET | Refills: 0 | OUTPATIENT
Start: 2019-10-19

## 2019-10-26 RX ORDER — ATORVASTATIN CALCIUM 10 MG/1
TABLET, FILM COATED ORAL
Qty: 90 TABLET | Refills: 0 | OUTPATIENT
Start: 2019-10-26

## 2020-02-04 RX ORDER — ATORVASTATIN CALCIUM 10 MG/1
TABLET, FILM COATED ORAL
Qty: 90 TABLET | Refills: 0 | Status: SHIPPED | OUTPATIENT
Start: 2020-02-04 | End: 2020-02-25

## 2020-02-04 NOTE — TELEPHONE ENCOUNTER
Passed protocol    Requesting ATORVASTATIN 10 MG Oral Tab  LOV: 6/25/19  RTC: 2 weeks  Last Relevant Labs: 2/21/19  Filled: 10/17/19 #90 with 0 refills    Future Appointments   Date Time Provider Edgar Jo   2/25/2020  9:00 AM Juana Rodas MD EM

## 2020-02-25 ENCOUNTER — OFFICE VISIT (OUTPATIENT)
Dept: INTERNAL MEDICINE CLINIC | Facility: CLINIC | Age: 45
End: 2020-02-25
Payer: COMMERCIAL

## 2020-02-25 ENCOUNTER — APPOINTMENT (OUTPATIENT)
Dept: LAB | Age: 45
End: 2020-02-25
Attending: INTERNAL MEDICINE
Payer: COMMERCIAL

## 2020-02-25 VITALS
HEART RATE: 83 BPM | RESPIRATION RATE: 16 BRPM | WEIGHT: 144 LBS | BODY MASS INDEX: 26.5 KG/M2 | DIASTOLIC BLOOD PRESSURE: 62 MMHG | HEIGHT: 62 IN | TEMPERATURE: 98 F | OXYGEN SATURATION: 99 % | SYSTOLIC BLOOD PRESSURE: 124 MMHG

## 2020-02-25 DIAGNOSIS — E78.00 PURE HYPERCHOLESTEROLEMIA: ICD-10-CM

## 2020-02-25 DIAGNOSIS — Z00.00 ROUTINE GENERAL MEDICAL EXAMINATION AT A HEALTH CARE FACILITY: Primary | ICD-10-CM

## 2020-02-25 LAB
ALBUMIN SERPL-MCNC: 3.8 G/DL (ref 3.4–5)
ALBUMIN/GLOB SERPL: 1 {RATIO} (ref 1–2)
ALP LIVER SERPL-CCNC: 46 U/L (ref 37–98)
ALT SERPL-CCNC: 29 U/L (ref 13–56)
ANION GAP SERPL CALC-SCNC: 5 MMOL/L (ref 0–18)
AST SERPL-CCNC: 18 U/L (ref 15–37)
BASOPHILS # BLD AUTO: 0.09 X10(3) UL (ref 0–0.2)
BASOPHILS NFR BLD AUTO: 1.4 %
BILIRUB SERPL-MCNC: 0.5 MG/DL (ref 0.1–2)
BILIRUB UR QL STRIP.AUTO: NEGATIVE
BUN BLD-MCNC: 13 MG/DL (ref 7–18)
BUN/CREAT SERPL: 20.6 (ref 10–20)
CALCIUM BLD-MCNC: 8.5 MG/DL (ref 8.5–10.1)
CHLORIDE SERPL-SCNC: 108 MMOL/L (ref 98–112)
CHOLEST SMN-MCNC: 160 MG/DL (ref ?–200)
CLARITY UR REFRACT.AUTO: CLEAR
CO2 SERPL-SCNC: 26 MMOL/L (ref 21–32)
COLOR UR AUTO: YELLOW
CREAT BLD-MCNC: 0.63 MG/DL (ref 0.55–1.02)
DEPRECATED RDW RBC AUTO: 41.5 FL (ref 35.1–46.3)
EOSINOPHIL # BLD AUTO: 0.26 X10(3) UL (ref 0–0.7)
EOSINOPHIL NFR BLD AUTO: 3.9 %
ERYTHROCYTE [DISTWIDTH] IN BLOOD BY AUTOMATED COUNT: 13.4 % (ref 11–15)
EST. AVERAGE GLUCOSE BLD GHB EST-MCNC: 111 MG/DL (ref 68–126)
GLOBULIN PLAS-MCNC: 3.7 G/DL (ref 2.8–4.4)
GLUCOSE BLD-MCNC: 79 MG/DL (ref 70–99)
GLUCOSE UR STRIP.AUTO-MCNC: NEGATIVE MG/DL
HBA1C MFR BLD HPLC: 5.5 % (ref ?–5.7)
HCT VFR BLD AUTO: 42.3 % (ref 35–48)
HDLC SERPL-MCNC: 57 MG/DL (ref 40–59)
HGB BLD-MCNC: 13.3 G/DL (ref 12–16)
IMM GRANULOCYTES # BLD AUTO: 0.01 X10(3) UL (ref 0–1)
IMM GRANULOCYTES NFR BLD: 0.2 %
KETONES UR STRIP.AUTO-MCNC: NEGATIVE MG/DL
LDLC SERPL CALC-MCNC: 92 MG/DL (ref ?–100)
LEUKOCYTE ESTERASE UR QL STRIP.AUTO: NEGATIVE
LYMPHOCYTES # BLD AUTO: 2.36 X10(3) UL (ref 1–4)
LYMPHOCYTES NFR BLD AUTO: 35.8 %
M PROTEIN MFR SERPL ELPH: 7.5 G/DL (ref 6.4–8.2)
MCH RBC QN AUTO: 26.8 PG (ref 26–34)
MCHC RBC AUTO-ENTMCNC: 31.4 G/DL (ref 31–37)
MCV RBC AUTO: 85.3 FL (ref 80–100)
MONOCYTES # BLD AUTO: 0.57 X10(3) UL (ref 0.1–1)
MONOCYTES NFR BLD AUTO: 8.6 %
NEUTROPHILS # BLD AUTO: 3.3 X10 (3) UL (ref 1.5–7.7)
NEUTROPHILS # BLD AUTO: 3.3 X10(3) UL (ref 1.5–7.7)
NEUTROPHILS NFR BLD AUTO: 50.1 %
NITRITE UR QL STRIP.AUTO: NEGATIVE
NONHDLC SERPL-MCNC: 103 MG/DL (ref ?–130)
OSMOLALITY SERPL CALC.SUM OF ELEC: 287 MOSM/KG (ref 275–295)
PATIENT FASTING Y/N/NP: YES
PATIENT FASTING Y/N/NP: YES
PH UR STRIP.AUTO: 5 [PH] (ref 4.5–8)
PLATELET # BLD AUTO: 159 10(3)UL (ref 150–450)
POTASSIUM SERPL-SCNC: 3.5 MMOL/L (ref 3.5–5.1)
PROT UR STRIP.AUTO-MCNC: NEGATIVE MG/DL
RBC # BLD AUTO: 4.96 X10(6)UL (ref 3.8–5.3)
SODIUM SERPL-SCNC: 139 MMOL/L (ref 136–145)
SP GR UR STRIP.AUTO: 1.02 (ref 1–1.03)
T4 SERPL-MCNC: 10.1 UG/DL (ref 4.8–13.9)
TRIGL SERPL-MCNC: 53 MG/DL (ref 30–149)
UROBILINOGEN UR STRIP.AUTO-MCNC: <2 MG/DL
VIT D+METAB SERPL-MCNC: 42.4 NG/ML (ref 30–100)
VLDLC SERPL CALC-MCNC: 11 MG/DL (ref 0–30)
WBC # BLD AUTO: 6.6 X10(3) UL (ref 4–11)

## 2020-02-25 PROCEDURE — 84436 ASSAY OF TOTAL THYROXINE: CPT | Performed by: INTERNAL MEDICINE

## 2020-02-25 PROCEDURE — 80053 COMPREHEN METABOLIC PANEL: CPT | Performed by: INTERNAL MEDICINE

## 2020-02-25 PROCEDURE — 81001 URINALYSIS AUTO W/SCOPE: CPT | Performed by: INTERNAL MEDICINE

## 2020-02-25 PROCEDURE — 36415 COLL VENOUS BLD VENIPUNCTURE: CPT | Performed by: INTERNAL MEDICINE

## 2020-02-25 PROCEDURE — 85025 COMPLETE CBC W/AUTO DIFF WBC: CPT | Performed by: INTERNAL MEDICINE

## 2020-02-25 PROCEDURE — 82306 VITAMIN D 25 HYDROXY: CPT | Performed by: INTERNAL MEDICINE

## 2020-02-25 PROCEDURE — 99396 PREV VISIT EST AGE 40-64: CPT | Performed by: INTERNAL MEDICINE

## 2020-02-25 PROCEDURE — 83036 HEMOGLOBIN GLYCOSYLATED A1C: CPT | Performed by: INTERNAL MEDICINE

## 2020-02-25 PROCEDURE — 80061 LIPID PANEL: CPT | Performed by: INTERNAL MEDICINE

## 2020-02-25 RX ORDER — ATORVASTATIN CALCIUM 10 MG/1
TABLET, FILM COATED ORAL
Qty: 90 TABLET | Refills: 3 | Status: SHIPPED | OUTPATIENT
Start: 2020-02-25 | End: 2021-04-06

## 2020-02-25 NOTE — PROGRESS NOTES
Dileep Mendoza [place]  10/5/1975 is a 38year old female.     Patient presents with:  CPX      HPI:   See below  Current Outpatient Medications   Medication Sig Dispense Refill   • atorvastatin 10 MG Oral Tab TAKE 1 TABLET BY MOUTH ONE TIME A DAY 90 ta stools, heartburn, nausea, vomiting no weight changes noted no heart burn noted-   Hematology:   Patient denies abnormal bleeding, easy bleeding, easy bruising. Enlarged lymph nodes none.    Genitourinary:   Patient denies difficulty urinating, frequent uri Edema: none. Pulses: present, bilateral.   Tremors: no.   Varicose veins: not present.    MUSCULOSKELETAL:   Cervical spines: normal.   L-S spines: normal.   Lower extremity joints: normal-Upper extremity joints: normal.   NEUROLOGICAL:   Babinski: nega

## 2020-03-23 ENCOUNTER — PATIENT MESSAGE (OUTPATIENT)
Dept: INTERNAL MEDICINE CLINIC | Facility: CLINIC | Age: 45
End: 2020-03-23

## 2020-03-23 NOTE — TELEPHONE ENCOUNTER
Patient calling because she has not heard back from doctor regarding message sent to him; she needs to let her employer know about note for coming back; please advise

## 2020-03-24 NOTE — TELEPHONE ENCOUNTER
Pt c/o \"pressure on the back of tongue\"/ sore throat, headache, and some cough in the morning (chronic). Pt denies fever, shortness of breath, or any other symptoms.      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

## 2020-03-24 NOTE — TELEPHONE ENCOUNTER
Patient apparently may be questionably has been exposed to a coworker with [employer]. Tests for 2 other coworkers showed 1 to be negative the other one is pending  Patient employer requested that she be tested for Covid 19 prior to return to work.   However p

## 2020-03-25 RX ORDER — FAMOTIDINE 20 MG/1
20 TABLET ORAL NIGHTLY PRN
Qty: 90 TABLET | Refills: 0 | Status: SHIPPED | OUTPATIENT
Start: 2020-03-25 | End: 2020-06-16

## 2020-06-16 RX ORDER — FAMOTIDINE 20 MG/1
TABLET ORAL
Qty: 90 TABLET | Refills: 0 | Status: SHIPPED | OUTPATIENT
Start: 2020-06-16 | End: 2020-09-15

## 2020-06-16 NOTE — TELEPHONE ENCOUNTER
Passed protocol    Requesting famoTIDine (PEPCID) 20 MG Oral Tab  LOV: 2/25/20  RTC: 1 year  Last Relevant Labs: 2/25/20  Filled: 3/25/20 #90 with 0 refills    No future appointments.

## 2020-08-13 ENCOUNTER — TELEPHONE (OUTPATIENT)
Dept: INTERNAL MEDICINE CLINIC | Facility: CLINIC | Age: 45
End: 2020-08-13

## 2020-08-13 NOTE — TELEPHONE ENCOUNTER
Screening completed. No s/s other than rash. OV scheduled.   Future Appointments   Date Time Provider Edgar Cisnerosi   8/14/2020  2:30 PM Regla Hyde MD [facility] [place]

## 2020-08-13 NOTE — TELEPHONE ENCOUNTER
Patient has a rash on her back that doesn't seem to go away.  Please call patient, no appt given yet

## 2020-08-13 NOTE — TELEPHONE ENCOUNTER
Recommend she come in for an office visit tomorrow as long as she is not having fevers/chills/sweats/cough/COVID symptoms. If she is having those infectious symptoms should do video visit. Go to ED with any significant wheezing/shortness of breath.

## 2020-08-13 NOTE — TELEPHONE ENCOUNTER
Pt stated rash started on right breast 1 week ago. Pt reports rash is now on sides, stomach, and back.  Pt reports rash is \"small, red, itchy patches\" denies pain, blisters, drainage, change in soap/lotion/detergent, shortness of breath, or any other symp

## 2020-08-14 ENCOUNTER — OFFICE VISIT (OUTPATIENT)
Dept: INTERNAL MEDICINE CLINIC | Facility: CLINIC | Age: 45
End: 2020-08-14
Payer: COMMERCIAL

## 2020-08-14 VITALS
HEIGHT: 62 IN | WEIGHT: 146 LBS | BODY MASS INDEX: 26.87 KG/M2 | DIASTOLIC BLOOD PRESSURE: 68 MMHG | OXYGEN SATURATION: 98 % | HEART RATE: 75 BPM | SYSTOLIC BLOOD PRESSURE: 110 MMHG | TEMPERATURE: 99 F | RESPIRATION RATE: 12 BRPM

## 2020-08-14 DIAGNOSIS — R21 RASH AND NONSPECIFIC SKIN ERUPTION: Primary | ICD-10-CM

## 2020-08-14 PROCEDURE — 99213 OFFICE O/P EST LOW 20 MIN: CPT | Performed by: INTERNAL MEDICINE

## 2020-08-14 PROCEDURE — 3008F BODY MASS INDEX DOCD: CPT | Performed by: INTERNAL MEDICINE

## 2020-08-14 PROCEDURE — 3074F SYST BP LT 130 MM HG: CPT | Performed by: INTERNAL MEDICINE

## 2020-08-14 PROCEDURE — 3078F DIAST BP <80 MM HG: CPT | Performed by: INTERNAL MEDICINE

## 2020-08-14 RX ORDER — METHYLPREDNISOLONE 4 MG/1
TABLET ORAL
Qty: 1 KIT | Refills: 0 | Status: SHIPPED | OUTPATIENT
Start: 2020-08-14 | End: 2020-12-24 | Stop reason: ALTCHOICE

## 2020-08-14 NOTE — PATIENT INSTRUCTIONS
- Switch from loratadine (Claritin) to cetirizine (Zyrtec). Take 1 tablet nightly.   - Triamcinolone cream prescription sent to pharmacy  - We will do a Medrol dosepak  - Follow up with Dr. Nestor Jennings as scheduled    It was a pleasure seeing you in the clinic t

## 2020-08-14 NOTE — PROGRESS NOTES
Alan Guevara is a 38year old female.    HPI:   Patient presents with:  Rash: pt complains of rash on chest area and back some itching pt noticed 2 weeks ago, has been taking loradatine and triamcinolone cream, which helps but then rash returns surgical history that includes d & c; paragard, iud (03/01/2020); and other (03/12/2020). Family: family history includes Hypertension in her father; Other in her father. Social:  reports that she has never smoked.  She has never used smokeless tobacco. S is asked to return to clinic in 6 months with Dr. Micki Love MD for follow up on chronic issues, or earlier if acute issues arise.     Xochilt Conway MD

## 2020-09-15 RX ORDER — FAMOTIDINE 20 MG/1
20 TABLET ORAL DAILY
Qty: 90 TABLET | Refills: 0 | Status: SHIPPED | OUTPATIENT
Start: 2020-09-15 | End: 2020-12-17

## 2020-09-15 NOTE — TELEPHONE ENCOUNTER
Refill requested:   Requested Prescriptions     Pending Prescriptions Disp Refills   • famoTIDine 20 MG Oral Tab [Pharmacy Med Name: FAMOTIDINE 20 MG TABLET] 90 tablet 0     Sig: Take 1 tablet (20 mg total) by mouth daily.      Last refill: 6--90 tab

## 2020-10-13 ENCOUNTER — PATIENT MESSAGE (OUTPATIENT)
Dept: INTERNAL MEDICINE CLINIC | Facility: CLINIC | Age: 45
End: 2020-10-13

## 2020-10-13 ENCOUNTER — APPOINTMENT (OUTPATIENT)
Dept: LAB | Age: 45
End: 2020-10-13
Attending: INTERNAL MEDICINE
Payer: COMMERCIAL

## 2020-10-13 DIAGNOSIS — Z20.822 EXPOSURE TO COVID-19 VIRUS: ICD-10-CM

## 2020-10-13 DIAGNOSIS — Z20.822 EXPOSURE TO COVID-19 VIRUS: Primary | ICD-10-CM

## 2020-10-13 NOTE — TELEPHONE ENCOUNTER
From: Harlan Jacob  To: Zeke Gonzales MD  Sent: 10/13/2020 8:25 AM CDT  Subject: Other    Dr Nancy Casiano,  Good morning!  We were just informed by our friend that she turns positive for Corona Virus; she got tested Sunday after knowing that her co-w

## 2020-10-30 ENCOUNTER — PATIENT MESSAGE (OUTPATIENT)
Dept: INTERNAL MEDICINE CLINIC | Facility: CLINIC | Age: 45
End: 2020-10-30

## 2020-10-30 NOTE — TELEPHONE ENCOUNTER
From: Harlan aJcob  To: Angelika Knott MD  Sent: 10/30/2020 8:31 AM CDT  Subject: Other    Good day Dr Stephanie Liz,    Lately i have been taking Zyrtec due to running nose. I noticed if i dont take Zyrtec i will have running nose and coughing .  Brooke Tucker

## 2020-12-17 RX ORDER — FAMOTIDINE 20 MG/1
TABLET ORAL
Qty: 90 TABLET | Refills: 0 | Status: SHIPPED | OUTPATIENT
Start: 2020-12-17 | End: 2021-03-22

## 2020-12-17 NOTE — TELEPHONE ENCOUNTER
Passed protocol    Requesting famoTIDine 20 MG Oral Tab  LOV: 8/14/20  RTC: 6 months  Last Relevant Labs: 2/25/20  Filled: 9/15/20 #90 with 0 refills    No future appointments.

## 2020-12-24 ENCOUNTER — LAB ENCOUNTER (OUTPATIENT)
Dept: LAB | Age: 45
End: 2020-12-24
Attending: NURSE PRACTITIONER
Payer: COMMERCIAL

## 2020-12-24 ENCOUNTER — TELEMEDICINE (OUTPATIENT)
Dept: INTERNAL MEDICINE CLINIC | Facility: CLINIC | Age: 45
End: 2020-12-24
Payer: COMMERCIAL

## 2020-12-24 DIAGNOSIS — Z20.822 ENCOUNTER BY TELEHEALTH FOR SUSPECTED COVID-19: ICD-10-CM

## 2020-12-24 DIAGNOSIS — Z20.822 ENCOUNTER BY TELEHEALTH FOR SUSPECTED COVID-19: Primary | ICD-10-CM

## 2020-12-24 PROCEDURE — 99213 OFFICE O/P EST LOW 20 MIN: CPT | Performed by: NURSE PRACTITIONER

## 2020-12-24 NOTE — PROGRESS NOTES
Virtual Telephone Check-In    [address] [facility] verbally consents to a Virtual/Telephone Check-In visit on 12/24/20. Patient verified identification with name and date of birth.      Patient understands and accepts financial responsibility for any ded History:  Social History    Tobacco Use      Smoking status: Never Smoker      Smokeless tobacco: Never Used    Alcohol use: Yes      Comment: social    Drug use: No       REVIEW OF SYSTEMS:   GENERAL: + fever, chills, fatigue   HENT: Denies congestion, rh the provider-patient relationship, due to the ongoing public health crisis/national emergency and because of restrictions of visitation. There are limitations of this visit as no physical exam could be performed.   Every conscious effort was taken to allow

## 2021-03-02 ENCOUNTER — OFFICE VISIT (OUTPATIENT)
Dept: INTERNAL MEDICINE CLINIC | Facility: CLINIC | Age: 46
End: 2021-03-02
Payer: COMMERCIAL

## 2021-03-02 VITALS
BODY MASS INDEX: 28.34 KG/M2 | RESPIRATION RATE: 14 BRPM | WEIGHT: 154 LBS | SYSTOLIC BLOOD PRESSURE: 122 MMHG | HEIGHT: 62 IN | OXYGEN SATURATION: 99 % | TEMPERATURE: 98 F | HEART RATE: 70 BPM | DIASTOLIC BLOOD PRESSURE: 64 MMHG

## 2021-03-02 DIAGNOSIS — Z00.00 ROUTINE GENERAL MEDICAL EXAMINATION AT A HEALTH CARE FACILITY: Primary | ICD-10-CM

## 2021-03-02 DIAGNOSIS — E78.00 PURE HYPERCHOLESTEROLEMIA: ICD-10-CM

## 2021-03-02 PROCEDURE — 3008F BODY MASS INDEX DOCD: CPT | Performed by: INTERNAL MEDICINE

## 2021-03-02 PROCEDURE — 99396 PREV VISIT EST AGE 40-64: CPT | Performed by: INTERNAL MEDICINE

## 2021-03-02 PROCEDURE — 3074F SYST BP LT 130 MM HG: CPT | Performed by: INTERNAL MEDICINE

## 2021-03-02 PROCEDURE — 3078F DIAST BP <80 MM HG: CPT | Performed by: INTERNAL MEDICINE

## 2021-03-02 RX ORDER — CETIRIZINE HYDROCHLORIDE 10 MG/1
CAPSULE, LIQUID FILLED ORAL
Qty: 30 CAPSULE | Refills: 0 | COMMUNITY
Start: 2021-03-02

## 2021-03-02 NOTE — PROGRESS NOTES
Fabiola Mendoza [place]  10/5/1975 is a 54year old female. Patient presents with:  CPX      HPI:   See below  Current Outpatient Medications   Medication Sig Dispense Refill   • Cetirizine HCl (ZYRTEC ALLERGY) 10 MG Oral Cap Take by mouth.  30 capsul dyspnea) none.  exercise yes  Gastrointestinal:   Patient denies abdominal pain, blood in stool, constipation, diarrhea, difficulty swallowing, change in stools, heartburn, nausea, vomiting no weight changes noted no heart burn noted-   Hematology:   Suzanna normal.   Hernia: absent. Inguinal nodes: none. Liver, Spleen: non-enlarged. Rebound tenderness: absent. Tenderness: absent . EXTREMITIES:   Clubbing: none. Cyanosis: absent . Edema: none.    Pulses: present, bilateral.   Tremors: no.   [place]

## 2021-03-04 ENCOUNTER — LAB ENCOUNTER (OUTPATIENT)
Dept: LAB | Age: 46
End: 2021-03-04
Attending: INTERNAL MEDICINE
Payer: COMMERCIAL

## 2021-03-04 LAB
ALBUMIN SERPL-MCNC: 4 G/DL (ref 3.4–5)
ALBUMIN/GLOB SERPL: 1.1 {RATIO} (ref 1–2)
ALP LIVER SERPL-CCNC: 52 U/L
ALT SERPL-CCNC: 29 U/L
ANION GAP SERPL CALC-SCNC: 5 MMOL/L (ref 0–18)
AST SERPL-CCNC: 19 U/L (ref 15–37)
BASOPHILS # BLD AUTO: 0.12 X10(3) UL (ref 0–0.2)
BASOPHILS NFR BLD AUTO: 1.6 %
BILIRUB SERPL-MCNC: 0.5 MG/DL (ref 0.1–2)
BILIRUB UR QL STRIP.AUTO: NEGATIVE
BUN BLD-MCNC: 13 MG/DL (ref 7–18)
BUN/CREAT SERPL: 20 (ref 10–20)
CALCIUM BLD-MCNC: 8.7 MG/DL (ref 8.5–10.1)
CHLORIDE SERPL-SCNC: 107 MMOL/L (ref 98–112)
CHOLEST SMN-MCNC: 180 MG/DL (ref ?–200)
CLARITY UR REFRACT.AUTO: CLEAR
CO2 SERPL-SCNC: 26 MMOL/L (ref 21–32)
COLOR UR AUTO: YELLOW
CREAT BLD-MCNC: 0.65 MG/DL
DEPRECATED RDW RBC AUTO: 41.5 FL (ref 35.1–46.3)
EOSINOPHIL # BLD AUTO: 0.38 X10(3) UL (ref 0–0.7)
EOSINOPHIL NFR BLD AUTO: 5 %
ERYTHROCYTE [DISTWIDTH] IN BLOOD BY AUTOMATED COUNT: 13.7 % (ref 11–15)
GLOBULIN PLAS-MCNC: 3.8 G/DL (ref 2.8–4.4)
GLUCOSE BLD-MCNC: 90 MG/DL (ref 70–99)
GLUCOSE UR STRIP.AUTO-MCNC: NEGATIVE MG/DL
HCT VFR BLD AUTO: 41.9 %
HDLC SERPL-MCNC: 73 MG/DL (ref 40–59)
HGB BLD-MCNC: 13 G/DL
IMM GRANULOCYTES # BLD AUTO: 0.01 X10(3) UL (ref 0–1)
IMM GRANULOCYTES NFR BLD: 0.1 %
KETONES UR STRIP.AUTO-MCNC: NEGATIVE MG/DL
LDLC SERPL CALC-MCNC: 97 MG/DL (ref ?–100)
LEUKOCYTE ESTERASE UR QL STRIP.AUTO: NEGATIVE
LYMPHOCYTES # BLD AUTO: 2.52 X10(3) UL (ref 1–4)
LYMPHOCYTES NFR BLD AUTO: 33.1 %
M PROTEIN MFR SERPL ELPH: 7.8 G/DL (ref 6.4–8.2)
MCH RBC QN AUTO: 26.1 PG (ref 26–34)
MCHC RBC AUTO-ENTMCNC: 31 G/DL (ref 31–37)
MCV RBC AUTO: 84.1 FL
MONOCYTES # BLD AUTO: 0.65 X10(3) UL (ref 0.1–1)
MONOCYTES NFR BLD AUTO: 8.5 %
NEUTROPHILS # BLD AUTO: 3.93 X10 (3) UL (ref 1.5–7.7)
NEUTROPHILS # BLD AUTO: 3.93 X10(3) UL (ref 1.5–7.7)
NEUTROPHILS NFR BLD AUTO: 51.7 %
NITRITE UR QL STRIP.AUTO: NEGATIVE
NONHDLC SERPL-MCNC: 107 MG/DL (ref ?–130)
OSMOLALITY SERPL CALC.SUM OF ELEC: 286 MOSM/KG (ref 275–295)
PATIENT FASTING Y/N/NP: YES
PATIENT FASTING Y/N/NP: YES
PH UR STRIP.AUTO: 5 [PH] (ref 5–8)
PLATELET # BLD AUTO: 181 10(3)UL (ref 150–450)
POTASSIUM SERPL-SCNC: 3.3 MMOL/L (ref 3.5–5.1)
PROT UR STRIP.AUTO-MCNC: NEGATIVE MG/DL
RBC # BLD AUTO: 4.98 X10(6)UL
RBC UR QL AUTO: NEGATIVE
SODIUM SERPL-SCNC: 138 MMOL/L (ref 136–145)
SP GR UR STRIP.AUTO: 1.02 (ref 1–1.03)
T4 SERPL-MCNC: 9.7 UG/DL
TRIGL SERPL-MCNC: 50 MG/DL (ref 30–149)
TSI SER-ACNC: 1.23 MIU/ML (ref 0.36–3.74)
URATE SERPL-MCNC: 4.5 MG/DL
UROBILINOGEN UR STRIP.AUTO-MCNC: <2 MG/DL
VIT D+METAB SERPL-MCNC: 35.7 NG/ML (ref 30–100)
VLDLC SERPL CALC-MCNC: 10 MG/DL (ref 0–30)
WBC # BLD AUTO: 7.6 X10(3) UL (ref 4–11)

## 2021-03-04 PROCEDURE — 82306 VITAMIN D 25 HYDROXY: CPT | Performed by: INTERNAL MEDICINE

## 2021-03-04 PROCEDURE — 81003 URINALYSIS AUTO W/O SCOPE: CPT | Performed by: INTERNAL MEDICINE

## 2021-03-04 PROCEDURE — 84550 ASSAY OF BLOOD/URIC ACID: CPT | Performed by: INTERNAL MEDICINE

## 2021-03-04 PROCEDURE — 80050 GENERAL HEALTH PANEL: CPT | Performed by: INTERNAL MEDICINE

## 2021-03-04 PROCEDURE — 36415 COLL VENOUS BLD VENIPUNCTURE: CPT | Performed by: INTERNAL MEDICINE

## 2021-03-04 PROCEDURE — 80061 LIPID PANEL: CPT | Performed by: INTERNAL MEDICINE

## 2021-03-04 PROCEDURE — 84436 ASSAY OF TOTAL THYROXINE: CPT | Performed by: INTERNAL MEDICINE

## 2021-03-09 ENCOUNTER — TELEPHONE (OUTPATIENT)
Dept: INTERNAL MEDICINE CLINIC | Facility: CLINIC | Age: 46
End: 2021-03-09

## 2021-03-09 NOTE — TELEPHONE ENCOUNTER
Pt called again for test results. -   Pt informed per Dr. Yadira juárez is low   kdur 20 meq daily for 14 days   Rpt bmp in 2 weeks  Pt.  Verbalized understanding    (pt will need an order for repeat labs and the [address] needs to be sent to [facility])    P

## 2021-03-19 ENCOUNTER — PATIENT MESSAGE (OUTPATIENT)
Dept: INTERNAL MEDICINE CLINIC | Facility: CLINIC | Age: 46
End: 2021-03-19

## 2021-03-20 NOTE — TELEPHONE ENCOUNTER
From: Harlan Jacob  To: Donnell Barry MD  Sent: 3/19/2021 6:10 PM CDT  Subject: Other    Kindly update my medical record, I received my Covid vaccine already. Thank you for the invitation. ...

## 2021-03-22 RX ORDER — FAMOTIDINE 20 MG/1
TABLET ORAL
Qty: 90 TABLET | Refills: 0 | Status: SHIPPED | OUTPATIENT
Start: 2021-03-22 | End: 2021-06-24

## 2021-03-22 NOTE — TELEPHONE ENCOUNTER
Protocol passed   Medication(s) to Refill:   Requested Prescriptions     Pending Prescriptions Disp Refills   • FAMOTIDINE 20 MG Oral Tab [Pharmacy Med Name: FAMOTIDINE 20 MG TABLET] 90 tablet 0     Sig: TAKE 1 TABLET BY MOUTH EVERY DAY     LOV: 3/2/2021

## 2021-03-25 ENCOUNTER — LAB ENCOUNTER (OUTPATIENT)
Dept: LAB | Age: 46
End: 2021-03-25
Attending: INTERNAL MEDICINE
Payer: COMMERCIAL

## 2021-03-25 PROCEDURE — 36415 COLL VENOUS BLD VENIPUNCTURE: CPT | Performed by: INTERNAL MEDICINE

## 2021-03-25 PROCEDURE — 80048 BASIC METABOLIC PNL TOTAL CA: CPT | Performed by: INTERNAL MEDICINE

## 2021-04-05 ENCOUNTER — TELEPHONE (OUTPATIENT)
Dept: ORTHOPEDICS CLINIC | Facility: CLINIC | Age: 46
End: 2021-04-05

## 2021-04-05 DIAGNOSIS — M25.561 RIGHT KNEE PAIN, UNSPECIFIED CHRONICITY: Primary | ICD-10-CM

## 2021-04-05 NOTE — TELEPHONE ENCOUNTER
Patient has an appointment scheduled with Dr. Martir Malik (prefers to see him) for right knee pain on 4/8. Will patient need imaging?

## 2021-04-06 RX ORDER — ATORVASTATIN CALCIUM 10 MG/1
TABLET, FILM COATED ORAL
Qty: 90 TABLET | Refills: 0 | Status: SHIPPED | OUTPATIENT
Start: 2021-04-06 | End: 2021-07-20

## 2021-04-08 ENCOUNTER — PATIENT MESSAGE (OUTPATIENT)
Dept: ORTHOPEDICS CLINIC | Facility: CLINIC | Age: 46
End: 2021-04-08

## 2021-04-08 ENCOUNTER — HOSPITAL ENCOUNTER (OUTPATIENT)
Dept: GENERAL RADIOLOGY | Age: 46
Discharge: HOME | End: 2021-04-08
Attending: ORTHOPAEDIC SURGERY
Payer: COMMERCIAL

## 2021-04-08 ENCOUNTER — OFFICE VISIT (OUTPATIENT)
Dept: ORTHOPEDICS CLINIC | Facility: CLINIC | Age: 46
End: 2021-04-08
Payer: COMMERCIAL

## 2021-04-08 VITALS — BODY MASS INDEX: 27.89 KG/M2 | HEART RATE: 70 BPM | HEIGHT: 63 IN | OXYGEN SATURATION: 99 % | WEIGHT: 157.38 LBS

## 2021-04-08 DIAGNOSIS — M76.31 ILIOTIBIAL BAND TENDINITIS OF RIGHT SIDE: Primary | ICD-10-CM

## 2021-04-08 DIAGNOSIS — M25.561 RIGHT KNEE PAIN, UNSPECIFIED CHRONICITY: ICD-10-CM

## 2021-04-08 DIAGNOSIS — M22.2X1 PATELLOFEMORAL PAIN SYNDROME OF RIGHT KNEE: ICD-10-CM

## 2021-04-08 DIAGNOSIS — M22.2X1 PATELLOFEMORAL PAIN SYNDROME OF RIGHT KNEE: Primary | ICD-10-CM

## 2021-04-08 DIAGNOSIS — M76.31 ILIOTIBIAL BAND TENDINITIS OF RIGHT SIDE: ICD-10-CM

## 2021-04-08 DIAGNOSIS — M70.61 TROCHANTERIC BURSITIS OF RIGHT HIP: ICD-10-CM

## 2021-04-08 PROCEDURE — 99203 OFFICE O/P NEW LOW 30 MIN: CPT | Performed by: ORTHOPAEDIC SURGERY

## 2021-04-08 PROCEDURE — 3008F BODY MASS INDEX DOCD: CPT | Performed by: ORTHOPAEDIC SURGERY

## 2021-04-08 PROCEDURE — 73564 X-RAY EXAM KNEE 4 OR MORE: CPT | Performed by: ORTHOPAEDIC SURGERY

## 2021-04-08 RX ORDER — NAPROXEN 500 MG/1
500 TABLET ORAL 2 TIMES DAILY WITH MEALS
Qty: 60 TABLET | Refills: 0 | Status: SHIPPED | OUTPATIENT
Start: 2021-04-08 | End: 2021-05-08

## 2021-04-08 NOTE — H&P
[facility] New Patient Note    CC: Patient presents with:  Knee Pain: right knee pain       HPI: This 54year old female presents today with complaints of right knee pain. Patient is also complaining of pain around the right hip.   Patient reports th • Other (Other) Father         CVA     Social History    Occupational History      Not on file    Tobacco Use      Smoking status: Never Smoker      Smokeless tobacco: Never Used    Vaping Use      Vaping Use: Never used    Substance and Sexual Activity PHYSICAL THERAPY EXTERNAL    Trochanteric bursitis of right hip  -     PHYSICAL THERAPY EXTERNAL    Iliotibial band tendinitis of right side  -     PHYSICAL THERAPY EXTERNAL      Assessment: 42-year-old female with chronic right knee and hip pain consisten

## 2021-04-08 NOTE — TELEPHONE ENCOUNTER
Please copy text from outpatient therapy script given today, paste into an [facility] script and accept, and call patient with contact info for [facility]

## 2021-04-08 NOTE — TELEPHONE ENCOUNTER
Patient  Called regarding message below. Patient request New Physical Therapy order to be though Patricia Domingo.    Please notify patient once Rx is placed. Thank you.

## 2021-04-08 NOTE — TELEPHONE ENCOUNTER
From: Chriss Andino  To: Winnie Barragan MD  Sent: 4/8/2021 1:49 PM CDT  Subject: Other    Good day!    I saw Dr Nely Fox this morning and I have requested a PT prescription (external) but I change my mind and I wanted to undergo Physical Therapy w

## 2021-04-09 NOTE — TELEPHONE ENCOUNTER
PT order created as requested below in previous TE by DR. Rere aKy. called patient and informed the order has been created. Also gave patient phone number to PT.  Patient understood

## 2021-04-13 ENCOUNTER — OFFICE VISIT (OUTPATIENT)
Dept: PHYSICAL THERAPY | Age: 46
End: 2021-04-13
Attending: ORTHOPAEDIC SURGERY
Payer: COMMERCIAL

## 2021-04-13 DIAGNOSIS — M70.61 TROCHANTERIC BURSITIS OF RIGHT HIP: ICD-10-CM

## 2021-04-13 DIAGNOSIS — M22.2X1 PATELLOFEMORAL PAIN SYNDROME OF RIGHT KNEE: ICD-10-CM

## 2021-04-13 DIAGNOSIS — M76.31 ILIOTIBIAL BAND TENDINITIS OF RIGHT SIDE: ICD-10-CM

## 2021-04-13 PROCEDURE — 97110 THERAPEUTIC EXERCISES: CPT

## 2021-04-13 PROCEDURE — 97161 PT EVAL LOW COMPLEX 20 MIN: CPT

## 2021-04-13 NOTE — PROGRESS NOTES
LOWER EXTREMITY EVALUATION:   Referring Physician: Dr. Pebbles Up  Diagnosis: Trochanteric bursitis R hip, Patello-femoral dysfunction R knee     Date of Service: 4/13/2021     PATIENT SUMMARY   Daja Ratliff is a 54year old female who presents None  OBJECTIVE:   Observation: Healthy female  Palpation: Tenderness lateral aspect R hip, crepitus R PF joint      AROM: (* denotes performed with pain)  Knee   Flexion: R 125*; L 135  Extension: R 0; L 0        Accessory motion: Crepitus R PF joint    F treatment limitation: None  Rehab Potential:good    Patient/Family/Caregiver was advised of these findings, precautions, and treatment options and has agreed to actively participate in planning and for this course of care.     Thank you for your referral. I

## 2021-04-15 ENCOUNTER — OFFICE VISIT (OUTPATIENT)
Dept: PHYSICAL THERAPY | Age: 46
End: 2021-04-15
Attending: ORTHOPAEDIC SURGERY
Payer: COMMERCIAL

## 2021-04-15 PROCEDURE — 97110 THERAPEUTIC EXERCISES: CPT

## 2021-04-15 PROCEDURE — 97140 MANUAL THERAPY 1/> REGIONS: CPT

## 2021-04-15 NOTE — PROGRESS NOTES
Dx: R hip and knee pain         Insurance (Authorized # of Visits):  6           Authorizing Physician: Dr. Clifford Tipton  Next MD visit: none scheduled  Fall Risk: standard         Precautions: n/a             Subjective: Pain R hip and knee 7/10, last night I min  Total Treatment Time: 45 min

## 2021-04-20 ENCOUNTER — OFFICE VISIT (OUTPATIENT)
Dept: PHYSICAL THERAPY | Age: 46
End: 2021-04-20
Attending: ORTHOPAEDIC SURGERY
Payer: COMMERCIAL

## 2021-04-20 DIAGNOSIS — M76.31 ILIOTIBIAL BAND TENDINITIS OF RIGHT SIDE: ICD-10-CM

## 2021-04-20 DIAGNOSIS — M70.61 TROCHANTERIC BURSITIS OF RIGHT HIP: ICD-10-CM

## 2021-04-20 DIAGNOSIS — M22.2X1 PATELLOFEMORAL PAIN SYNDROME OF RIGHT KNEE: ICD-10-CM

## 2021-04-20 PROCEDURE — 97110 THERAPEUTIC EXERCISES: CPT

## 2021-04-20 NOTE — PROGRESS NOTES
Dx: R hip and knee pain         Insurance (Authorized # of Visits):  6           Authorizing Physician: Dr. Charisma Aldana  Next MD visit: none scheduled  Fall Risk: standard         Precautions: n/a             Subjective: Pain R hip and knee after work yesterd medial glide R knee using Leukotape  Supine quad sets over towel roll R 20 x 3 sec hold      DBL leg press 4 bands x 20  SL press 3 bands R 2 x 10  Side stepping with yellow t band 10 stes each R/L x 2 Repeated step ups fwd and lateral 6in R x 20      HEP:

## 2021-04-27 ENCOUNTER — OFFICE VISIT (OUTPATIENT)
Dept: PHYSICAL THERAPY | Age: 46
End: 2021-04-27
Attending: SURGERY
Payer: COMMERCIAL

## 2021-04-27 PROCEDURE — 97110 THERAPEUTIC EXERCISES: CPT

## 2021-04-27 NOTE — PROGRESS NOTES
Dx: R hip and knee pain         Insurance (Authorized # of Visits):  6           Authorizing Physician: Dr. Domenico Figueroa  Next MD visit: none scheduled  Fall Risk: standard         Precautions: n/a           Subjective: Pain R hip and knee 3/10, average pain o lying hip abd R 2 x 15  Prone alternating knee flexion R/L x 10  Hook lying bridging x 20 Side stepping with red t band around lower leg 6 lengths of parallel bars  DBL leg press 6 bands x 20  Prone lying alternating knee flexion R/L x 10  Prone lying alte

## 2021-05-04 ENCOUNTER — OFFICE VISIT (OUTPATIENT)
Dept: PHYSICAL THERAPY | Age: 46
End: 2021-05-04
Attending: ORTHOPAEDIC SURGERY
Payer: COMMERCIAL

## 2021-05-04 PROCEDURE — 97110 THERAPEUTIC EXERCISES: CPT

## 2021-05-04 NOTE — PROGRESS NOTES
Dx: R hip and knee pain         Insurance (Authorized # of Visits):  6           Authorizing Physician: Dr. Charisma Aldana  Next MD visit: none scheduled  Fall Risk: standard         Precautions: n/a           Subjective: Pain R hip and knee 3/10, average pain o press 6 bands x 20  SL press 4 bands R/L x 20  SLS fwd reach, hip ext R/L x 10    Hook lying bridging 2 x 10  Quad sets R over roll 2 x  10  Patella mobilizations R 2 mins Side lying hip abd R 2 x 15  Prone alternating knee flexion R/L x 10  Hook lying porsche

## 2021-05-05 ENCOUNTER — PATIENT MESSAGE (OUTPATIENT)
Dept: ORTHOPEDICS CLINIC | Facility: CLINIC | Age: 46
End: 2021-05-05

## 2021-05-05 NOTE — TELEPHONE ENCOUNTER
From: Dickson Andino  To: Josh Mace MD  Sent: 5/5/2021 8:23 AM CDT  Subject: Other    Dr Giselle Parra,   I have been doing and seeing Tyler Merle for PT; yesterday after session my right hip hurts so bad, 9/10 pain when bending and pain radiates down t

## 2021-05-06 ENCOUNTER — OFFICE VISIT (OUTPATIENT)
Dept: PHYSICAL THERAPY | Age: 46
End: 2021-05-06
Attending: ORTHOPAEDIC SURGERY
Payer: COMMERCIAL

## 2021-05-06 PROCEDURE — 97035 APP MDLTY 1+ULTRASOUND EA 15: CPT

## 2021-05-06 PROCEDURE — 97140 MANUAL THERAPY 1/> REGIONS: CPT

## 2021-05-06 NOTE — PROGRESS NOTES
Dx: R hip and knee pain         Insurance (Authorized # of Visits):  6           Authorizing Physician: Dr. Laila Tinsley  Next MD visit: none scheduled  Fall Risk: standard         Precautions: n/a           Subjective:  Increased pain in the R glute, lateral t mins  Patella taped into medial glide using Leukotape   Step up repeat fwd 6in R x 20  Step up lat 6in R x 8 (stopped due to pain 7/10) Stationary bike level 2 5 mins  DBL leg press 6 bands x 20  SL press 4 bands R/L x 20  SLS fwd reach, hip ext R/L x 10 R lying hip abd, hook lying bridging, side stepping with red t band, SLS with fwd reach/hip ext    Charges: MT 1, US, CP      Total Timed Treatment: 30 min  Total Treatment Time: 40 min

## 2021-05-08 ENCOUNTER — MOBILE ENCOUNTER (OUTPATIENT)
Dept: ORTHOPEDICS CLINIC | Facility: CLINIC | Age: 46
End: 2021-05-08

## 2021-05-08 ENCOUNTER — PATIENT MESSAGE (OUTPATIENT)
Dept: ORTHOPEDICS CLINIC | Facility: CLINIC | Age: 46
End: 2021-05-08

## 2021-05-09 ENCOUNTER — PATIENT MESSAGE (OUTPATIENT)
Dept: ORTHOPEDICS CLINIC | Facility: CLINIC | Age: 46
End: 2021-05-09

## 2021-05-09 DIAGNOSIS — M25.551 RIGHT HIP PAIN: Primary | ICD-10-CM

## 2021-05-10 NOTE — TELEPHONE ENCOUNTER
Patient has scheduled follow up appt.   Future Appointments   Date Time Provider Edgar Jo   5/11/2021  9:45 AM Shannan Quiroz PT [facility] [place]   5/13/2021  9:00 AM Cristopher Almeida MD [facility]   5/13/2021  2:00 PM Farida Jameson

## 2021-05-10 NOTE — TELEPHONE ENCOUNTER
Please advise   4/8/21 Right knee pain  Naproxen did help in the past.  I did provide 1 months worth, with instructions to take it regularly for the next week or 2 to help decrease the current inciting event, and then take the rest as needed.   We will have

## 2021-05-10 NOTE — TELEPHONE ENCOUNTER
Patient states that she continued to take Naproxen medication. States that pain has  improved, however she took the day off today due to pain in the hip      Patient asked some questions regarding injection, unable to answer due to being medical questions.

## 2021-05-10 NOTE — TELEPHONE ENCOUNTER
From: Chriss Andino  To: Winnie Barragan MD  Sent: 5/8/2021 7:55 AM CDT  Subject: Visit Follow-up Question    Good morning!  Dr Nely Fox i am in so much pain, been taking my Naproxen for 4days now and I am having a hard time walking, bending, movin

## 2021-05-11 ENCOUNTER — OFFICE VISIT (OUTPATIENT)
Dept: PHYSICAL THERAPY | Age: 46
End: 2021-05-11
Attending: ORTHOPAEDIC SURGERY
Payer: COMMERCIAL

## 2021-05-11 PROCEDURE — 97110 THERAPEUTIC EXERCISES: CPT

## 2021-05-11 NOTE — PROGRESS NOTES
Dx: R hip and knee pain         Insurance (Authorized # of Visits):  6           Authorizing Physician: Dr. Nubia Olson  Next MD visit: none scheduled  Fall Risk: standard         Precautions: n/a           Subjective:  Increased pain in the R glute over the w press L 4 bands x 20  SL press R 3 bands x 20  Standing CKC TKE with blue t band R/L x 20 Stationary bike level 2 5 mins  Patella taped into medial glide using Leukotape   Step up repeat fwd 6in R x 20  Step up lat 6in R x 8 (stopped due to pain 7/10) Stat 20  Prone hip extension R/L x 10  Manual quads stretch R 2 x 20 sec hold  Supine manual hamstring stretch R/L 30 sec hold x 2     DBL leg press 4 bands x 20  SL press 3 bands R 2 x 10  Side stepping with yellow t band 10 stes each R/L x 2 Repeated step ups

## 2021-05-13 ENCOUNTER — OFFICE VISIT (OUTPATIENT)
Dept: PHYSICAL THERAPY | Age: 46
End: 2021-05-13
Attending: ORTHOPAEDIC SURGERY
Payer: COMMERCIAL

## 2021-05-13 ENCOUNTER — HOSPITAL ENCOUNTER (OUTPATIENT)
Dept: GENERAL RADIOLOGY | Age: 46
Discharge: HOME | End: 2021-05-13
Attending: ORTHOPAEDIC SURGERY
Payer: COMMERCIAL

## 2021-05-13 ENCOUNTER — OFFICE VISIT (OUTPATIENT)
Dept: ORTHOPEDICS CLINIC | Facility: CLINIC | Age: 46
End: 2021-05-13
Payer: COMMERCIAL

## 2021-05-13 VITALS — HEIGHT: 63 IN | WEIGHT: 157.38 LBS | OXYGEN SATURATION: 100 % | HEART RATE: 71 BPM | BODY MASS INDEX: 27.89 KG/M2

## 2021-05-13 DIAGNOSIS — M25.551 RIGHT HIP PAIN: ICD-10-CM

## 2021-05-13 DIAGNOSIS — M25.559 HIP PAIN: Primary | ICD-10-CM

## 2021-05-13 PROCEDURE — 3008F BODY MASS INDEX DOCD: CPT | Performed by: ORTHOPAEDIC SURGERY

## 2021-05-13 PROCEDURE — 97110 THERAPEUTIC EXERCISES: CPT

## 2021-05-13 PROCEDURE — 73502 X-RAY EXAM HIP UNI 2-3 VIEWS: CPT | Performed by: ORTHOPAEDIC SURGERY

## 2021-05-13 PROCEDURE — 99213 OFFICE O/P EST LOW 20 MIN: CPT | Performed by: ORTHOPAEDIC SURGERY

## 2021-05-13 RX ORDER — NAPROXEN 500 MG/1
500 TABLET ORAL 2 TIMES DAILY
COMMUNITY
Start: 2021-05-04 | End: 2021-06-25

## 2021-05-13 RX ORDER — METHYLPREDNISOLONE 4 MG/1
TABLET ORAL
Qty: 1 KIT | Refills: 0 | Status: SHIPPED | OUTPATIENT
Start: 2021-05-13 | End: 2021-05-27

## 2021-05-13 NOTE — PROGRESS NOTES
[facility] Progress Note    Subjective: Patient returns for discussion and evaluation of right hip area pain. She reports that the pain was severe over the weekend, has been taking the naproxen but it has not sufficiently helped her pain.   Pain was

## 2021-05-13 NOTE — PROGRESS NOTES
Dx: R hip and knee pain         Insurance (Authorized # of Visits):  6           Authorizing Physician: Dr. Kelly Freeman MD visit: none scheduled  Fall Risk: standard         Precautions: LBP         Discharge Note:    Subjective:Consult with Dr Kelly Jeffrey stretch R 3 x 10 sec hold  Side lying STM lateral hip and thigh R 10 mins Stationary bike level 2 5 mins  DBL leg press 5 bands x 20  SL press L 4 bands x 20  SL press R 3 bands x 20  Standing CKC TKE with blue t band R/L x 20 Stationary bike level 2 5 min x 10  Hook lying gentle ER stretch ankle across knee R/L x 3   Prone lying manual quads stretching R 2 x 10 sec hold  Supine manual stretching hamstrings and piriformis R 3 x 10 sec hold Patella taping into medial glide R knee using Leukotape  Supine quad

## 2021-05-27 ENCOUNTER — TELEPHONE (OUTPATIENT)
Dept: NEUROLOGY | Facility: CLINIC | Age: 46
End: 2021-05-27

## 2021-05-27 ENCOUNTER — OFFICE VISIT (OUTPATIENT)
Dept: NEUROLOGY | Facility: CLINIC | Age: 46
End: 2021-05-27
Payer: COMMERCIAL

## 2021-05-27 VITALS
OXYGEN SATURATION: 98 % | WEIGHT: 154 LBS | HEART RATE: 85 BPM | HEIGHT: 62 IN | DIASTOLIC BLOOD PRESSURE: 82 MMHG | BODY MASS INDEX: 28.34 KG/M2 | SYSTOLIC BLOOD PRESSURE: 120 MMHG

## 2021-05-27 DIAGNOSIS — G89.29 CHRONIC RIGHT-SIDED LOW BACK PAIN WITH RIGHT-SIDED SCIATICA: Primary | ICD-10-CM

## 2021-05-27 DIAGNOSIS — M54.41 CHRONIC RIGHT-SIDED LOW BACK PAIN WITH RIGHT-SIDED SCIATICA: Primary | ICD-10-CM

## 2021-05-27 PROCEDURE — 3079F DIAST BP 80-89 MM HG: CPT | Performed by: PHYSICAL MEDICINE & REHABILITATION

## 2021-05-27 PROCEDURE — 99244 OFF/OP CNSLTJ NEW/EST MOD 40: CPT | Performed by: PHYSICAL MEDICINE & REHABILITATION

## 2021-05-27 PROCEDURE — 3074F SYST BP LT 130 MM HG: CPT | Performed by: PHYSICAL MEDICINE & REHABILITATION

## 2021-05-27 PROCEDURE — 3008F BODY MASS INDEX DOCD: CPT | Performed by: PHYSICAL MEDICINE & REHABILITATION

## 2021-05-27 RX ORDER — MELOXICAM 15 MG/1
TABLET ORAL
Qty: 30 TABLET | Refills: 0 | Status: SHIPPED | OUTPATIENT
Start: 2021-05-27 | End: 2021-07-16 | Stop reason: ALTCHOICE

## 2021-05-27 NOTE — PATIENT INSTRUCTIONS
Do not take meloxicam and naproxen at the same time - take one or the other, not both. OK to take tylenol with the meloxicam or with the naproxen.

## 2021-05-27 NOTE — TELEPHONE ENCOUNTER
Contacted [employer] to initiate authorization for L-Spine MRI CPT 22705 to be done at [address] [place] [address].   Coverage is active    Status: Approved with order # [id number] valid 5/27/21-6/25/21    Patient notified of the above and was transferred to [address] [place] [address] scheduling

## 2021-05-27 NOTE — H&P
Willis Almeida 36    History and Physical    [facility] Patient Status:  No patient class for patient encounter    10/5/1975 MRN [id number]   [facility]  Attending No att. providers found   [facility] naproxen. She does have a history of acid reflux, and when the symptoms are severe she will sometimes vomit.   She currently takes famotidine 20 mg; was on pantoprazole which gave her side effects in the past.    History     Past Medical History:   Diagnos Visit: denies  Tingling/Numbness: denies  Balance: denies   Psychiatric  Anxiety: denies  Depressed Mood: denies     Physical Exam:   Vital Signs:  Blood pressure 120/82, pulse 85, height 62\", weight 154 lb (69.9 kg) SpO2 98 %    Nursing note and vitals r proceed with an MRI of the lumbar spine for further evaluation. In the meantime she will continue with Tylenol, may trial meloxicam 15 mg daily for the next 7 days, then daily as needed for pain.   If she elects to trial meloxicam she was instructed to sto

## 2021-06-03 ENCOUNTER — HOSPITAL ENCOUNTER (OUTPATIENT)
Dept: MRI IMAGING | Age: 46
Discharge: HOME | End: 2021-06-03
Attending: PHYSICAL MEDICINE & REHABILITATION
Payer: COMMERCIAL

## 2021-06-03 DIAGNOSIS — M54.41 CHRONIC RIGHT-SIDED LOW BACK PAIN WITH RIGHT-SIDED SCIATICA: ICD-10-CM

## 2021-06-03 DIAGNOSIS — G89.29 CHRONIC RIGHT-SIDED LOW BACK PAIN WITH RIGHT-SIDED SCIATICA: ICD-10-CM

## 2021-06-03 PROCEDURE — 72148 MRI LUMBAR SPINE W/O DYE: CPT | Performed by: PHYSICAL MEDICINE & REHABILITATION

## 2021-06-07 ENCOUNTER — TELEPHONE (OUTPATIENT)
Dept: NEUROLOGY | Facility: CLINIC | Age: 46
End: 2021-06-07

## 2021-06-10 ENCOUNTER — TELEPHONE (OUTPATIENT)
Dept: NEUROLOGY | Facility: CLINIC | Age: 46
End: 2021-06-10

## 2021-06-10 ENCOUNTER — OFFICE VISIT (OUTPATIENT)
Dept: NEUROLOGY | Facility: CLINIC | Age: 46
End: 2021-06-10
Payer: COMMERCIAL

## 2021-06-10 VITALS
HEIGHT: 62 IN | BODY MASS INDEX: 28.34 KG/M2 | DIASTOLIC BLOOD PRESSURE: 70 MMHG | HEART RATE: 78 BPM | OXYGEN SATURATION: 97 % | WEIGHT: 154 LBS | SYSTOLIC BLOOD PRESSURE: 120 MMHG

## 2021-06-10 DIAGNOSIS — M53.3 PAIN OF RIGHT SACROILIAC JOINT: Primary | ICD-10-CM

## 2021-06-10 PROCEDURE — 3008F BODY MASS INDEX DOCD: CPT | Performed by: PHYSICAL MEDICINE & REHABILITATION

## 2021-06-10 PROCEDURE — 3078F DIAST BP <80 MM HG: CPT | Performed by: PHYSICAL MEDICINE & REHABILITATION

## 2021-06-10 PROCEDURE — 99214 OFFICE O/P EST MOD 30 MIN: CPT | Performed by: PHYSICAL MEDICINE & REHABILITATION

## 2021-06-10 PROCEDURE — 3074F SYST BP LT 130 MM HG: CPT | Performed by: PHYSICAL MEDICINE & REHABILITATION

## 2021-06-10 NOTE — TELEPHONE ENCOUNTER
Medical clearance needed- Rachel / Dr. Ramos    Meds to hold - None    Implanted cardiac device/SCS/PNS: None    Anesthesia Type: local    Office or CFS: CFS    Please list entire procedure name: right sacroiliac joint steroid injection under fluoroscopy, loc

## 2021-06-10 NOTE — PROGRESS NOTES
Progress note    C/C: right low back pain    HPI: 25-year-old female presents for follow-up. Underwent MRI of the lumbar spine, returns to discuss results.   Perhaps mild improvements with meloxicam 15 mg, but she still continues to have right lower back p impingement. Assessment and plan  #1 pain of right sacroiliac joint  #2 history of GERD    Given the normal MRI, and with the symptomatology localized over the right aspect of the sacroiliac joint we will treat this as sacroiliac joint pain.   We discuss

## 2021-06-11 NOTE — TELEPHONE ENCOUNTER
Prior authorization request completed for: Right [address] #No Prior Authorization/Nor Pre Determination is Required   Spoke with Lottie Cushing at [address] [facility] [phone]  Reference #:[id number]   Time:09:37    Authorization dates: N/A   CPT c

## 2021-06-14 NOTE — TELEPHONE ENCOUNTER
[address]  PRE-PROCEDURE INSTRUCTIONS WITHOUT SEDATION       Procedure - Right MICHAEL w/LOCAL @ [facility] w/Dr Keara Costa   Appointment Date: 6/25/2021  Check-In Time: TBD by [facility] schedule- [address] [facility] Medications and Referral (if applicable) to your appointment. • Please park in the [address] and follow the signs to the [facility]. • Check in at [facility] ([address].  [place] [address], [facility]) outpatient registration in the [place] HERBAL SUPPLEMENTS  5 days  · Fish oil, krill oil, Omega-3, vitamin E, Turmeric, Garlic                                   Insurance Authorization:   Most insurances are now requiring a preauthorization for all procedures.   Please allow 5-7 business day

## 2021-06-14 NOTE — TELEPHONE ENCOUNTER
Scheduling form with face sheet and copy of insurance cards faxed to Geni Torres @ [facility] . Confirmation received. [facility] [employer] message sent to patient with pre-procedure instructions.

## 2021-06-20 DIAGNOSIS — G89.29 CHRONIC RIGHT-SIDED LOW BACK PAIN WITH RIGHT-SIDED SCIATICA: ICD-10-CM

## 2021-06-20 DIAGNOSIS — M54.41 CHRONIC RIGHT-SIDED LOW BACK PAIN WITH RIGHT-SIDED SCIATICA: ICD-10-CM

## 2021-06-21 RX ORDER — MELOXICAM 15 MG/1
TABLET ORAL
Qty: 30 TABLET | Refills: 0 | OUTPATIENT
Start: 2021-06-21

## 2021-06-24 RX ORDER — FAMOTIDINE 20 MG/1
TABLET ORAL
Qty: 90 TABLET | Refills: 0 | Status: SHIPPED | OUTPATIENT
Start: 2021-06-24 | End: 2021-09-17

## 2021-06-24 NOTE — TELEPHONE ENCOUNTER
Faxed confirmation of scheduled procedure received from [facility].      Sent to scan

## 2021-06-24 NOTE — TELEPHONE ENCOUNTER
Name from pharmacy: FAMOTIDINE 20 MG TABLET          Will file in chart as: FAMOTIDINE 20 MG Oral Tab    Sig: TAKE 1 TABLET BY MOUTH EVERY DAY *NOT COVERED    Disp:  90 tablet    Refills:  0 (Pharmacy requested: Not specified)    Start: 6/24/2021    Class:

## 2021-06-25 ENCOUNTER — OFFICE VISIT (OUTPATIENT)
Dept: SURGERY | Facility: CLINIC | Age: 46
End: 2021-06-25

## 2021-06-25 DIAGNOSIS — M53.3 PAIN OF RIGHT SACROILIAC JOINT: Primary | ICD-10-CM

## 2021-06-25 PROCEDURE — 27096 INJECT SACROILIAC JOINT: CPT | Performed by: PHYSICAL MEDICINE & REHABILITATION

## 2021-06-25 NOTE — PROCEDURES
DATE OF PROCEDURE: 6/25/21   PREOPERATIVE DIAGNOSES: right sacroiliac joint pain   POSTOPERATIVE DIAGNOSES:   right sacroiliac joint pain   PROCEDURES: right SI Joint injections done under fluoroscopic guidance with contrast enhancement.    SURGEON: Lala Baird No

## 2021-07-14 NOTE — TELEPHONE ENCOUNTER
This is a reminder from today's visit:     Please remember to complete labs if you had any labs ordered today. It may take several days to receive your results.       If you need assistance with referral please contact referral coordinator Julius Atkinson at [phone]    Please call the radiology department to schedule your imaging at: [phone] to schedule pelvic ultrasound and dexa scan    Please call the OB/GYN department at: [phone]    Patient Education     Prevention Guidelines, Women Ages 50 to 64  Screening tests and vaccines are an important part of managing your health. A screening test is done to find possible disorders or diseases in people who don't have any symptoms. The goal is to find a disease early so lifestyle changes can be made and you can be watched more closely to reduce the risk of disease, or to detect it early enough to treat it most effectively. Screening tests are not considered diagnostic, but are used to determine if more testing is needed. Health counseling is essential, too. Below are guidelines for these, for women ages 50 to 64. Talk with your healthcare provider to make sure you’re up to date on what you need.  Screening Who needs it How often   Type 2 diabetes or prediabetes All women beginning at age 45 and women without symptoms at any age who are overweight or obese and have 1 or more additional risk factors for diabetes. At  least every 3 years   Type 2 diabetes or prediabetes All women diagnosed with gestational diabetes Lifelong testing every 3 years   Type 2 diabetes All women with prediabetes Every year   Alcohol misuse All women in this age group At routine exams   Blood pressure All women in this age group Yearly checkup if your blood pressure is normal  Normal blood pressure is less than 120/80 mm Hg  If your blood pressure reading is higher than normal, follow the advice of your healthcare provider   Breast cancer All women at average risk in this age group  Pt called to request a copy of the HIDA scan. Checked the chart and this was released to My Chart on 8/31 - informed pt and she verbalized understanding. Yearly mammogram should be done until age 54. At age 55, switch to mammograms every other year or choose to continue yearly mammograms.  All women should be familiar with the potential benefits and risks of breast cancer screening with mammograms.      Cervical cancer All women in this age group, except women who have had a complete hysterectomy Pap test every 3 years or Pap test with human papillomavirus (HPV) test every 5 years   Chlamydia Women at increased risk for infection At routine exams   Colorectal cancer All women in this age group Flexible sigmoidoscopy every 5 years, or colonoscopy every 10 years, or double-contrast barium enema every 5 years; yearly fecal occult blood test or fecal immunochemical test; or a stool DNA test as often as your health care provider advises; talk with your health care provider about which tests are best for you   Depression All women in this age group At routine exams   Gonorrhea Sexually active women at increased risk for infection At routine exams   Hepatitis C Anyone at increased risk; 1 time for those born between 1945 and 1965 At routine exams   High cholesterol or triglycerides All women in this age group who are at risk for coronary artery disease At least every 5 years   HIV All women At routine exams   Lung cancer Adults age 55 to 80 who have smoked Yearly screening in smokers with 30 pack-year history of smoking or who quit within 15 years   Obesity All women in this age group At routine exams   Osteoporosis Women who are postmenopausal Ask your healthcare provider   Syphilis Women at increased risk for infection - talk with your healthcare provider At routine exams   Tuberculosis Women at increased risk for infection - talk with your healthcare provider Ask your healthcare provider   Vision All women in this age group Ask your healthcare provider   Vaccine Who needs it How often   Chickenpox (varicella) All women in this age group who have no record of this  infection or vaccine 2 doses; the second dose should be given at least 4 weeks after the first dose   Hepatitis A Women at increased risk for infection - talk with your healthcare provider 2 doses given at least 6 months apart   Hepatitis B Women at increased risk for infection - talk with your healthcare provider 3 doses over 6 months; second dose should be given 1 month after the first dose; the third dose should be given at least 2 months after the second dose and at least 4 months after the first dose   Haemophilus influenzaeType B (HIB) Women at increased risk for infection - talk with your healthcare provider 1 to 3 doses   Influenza (flu) All women in this age group Once a year   Measles, mumps, rubella (MMR) Women in this age group through their late 50s who have no record of these infections or vaccines 1 dose   Meningococcal Women at increased risk for infection - talk with your healthcare provider 1 or more doses   Pneumococcal conjugate vaccine (PCV13) and pneumococcal polysaccharide vaccine (PPSV23) Women at increased risk for infection - talk with your healthcare provider PCV13: 1 dose ages 19 to 65 (protects against 13 types of pneumococcal bacteria)  PPSV23: 1 to 2 doses through age 64, or 1 dose at 65 or older (protects against 23 types of pneumococcal bacteria)   Tetanus/diphtheria/pertussis (Td/Tdap) booster All women in this age group Td every 10 years, or a one-time dose of Tdap instead of a Td booster after age 18, then Td every 10 years   Zoster All women ages 60 and older 1 dose   Counseling Who needs it How often   BRCA gene mutation testing for breast and ovarian cancer susceptibility Women with increased risk for having gene mutation When your risk is known   Breast cancer and chemoprevention Women at high risk for breast cancer When your risk is known   Diet and exercise Women who are overweight or obese When diagnosed, and then at routine exams   Sexually transmitted infection prevention  Women at increased risk for infection - talk with your healthcare provider At routine exams   Use of daily aspirin Women ages 55 and up in this age group who are at risk for cardiovascular health problems such as stroke When your risk is known   Use of tobacco and the health effects it can cause All women in this age group Every exam   1American [employer] Society  Date Last Reviewed: 1/26/2016  © 1002-9667 [employer] [address] [place] [address], [place] [address]. All rights reserved. This information is not intended as a substitute for professional medical care. Always follow your healthcare professional's instructions.

## 2021-07-16 ENCOUNTER — OFFICE VISIT (OUTPATIENT)
Dept: PHYSICAL MEDICINE AND REHAB | Facility: CLINIC | Age: 46
End: 2021-07-16
Payer: COMMERCIAL

## 2021-07-16 VITALS
HEART RATE: 90 BPM | OXYGEN SATURATION: 98 % | BODY MASS INDEX: 28 KG/M2 | DIASTOLIC BLOOD PRESSURE: 72 MMHG | WEIGHT: 153 LBS | SYSTOLIC BLOOD PRESSURE: 116 MMHG | RESPIRATION RATE: 16 BRPM

## 2021-07-16 DIAGNOSIS — M79.18 RIGHT BUTTOCK PAIN: Primary | ICD-10-CM

## 2021-07-16 PROCEDURE — 3074F SYST BP LT 130 MM HG: CPT | Performed by: PHYSICAL MEDICINE & REHABILITATION

## 2021-07-16 PROCEDURE — 3078F DIAST BP <80 MM HG: CPT | Performed by: PHYSICAL MEDICINE & REHABILITATION

## 2021-07-16 PROCEDURE — 99214 OFFICE O/P EST MOD 30 MIN: CPT | Performed by: PHYSICAL MEDICINE & REHABILITATION

## 2021-07-16 NOTE — PROGRESS NOTES
Progress note    C/C: right buttock pain    HPI: 58-year-old female presents for follow-up. She continues to have right buttock pain that worsens with stooping/forward bending, prolonged standing, and lying supine.   No radiation into the lower extremity, physical therapy for SIJ stabilization, if symptoms persist consider MRI of the sacrum/pelvis before other spinal interventional procedures are done. 30 minutes spent precharting, conducting H&P, discussing treatment options, completing documentation.

## 2021-07-16 NOTE — PROGRESS NOTES
Last procedure: right SI Joint injections done under fluoroscopic guidance with contrast enhancement.   Date: 6/25/21  Percentage of relief obtained: 20%  Duration of relief: current    Current Pain Score: 6/10

## 2021-07-20 DIAGNOSIS — E78.00 PURE HYPERCHOLESTEROLEMIA: Primary | ICD-10-CM

## 2021-07-20 RX ORDER — ATORVASTATIN CALCIUM 10 MG/1
TABLET, FILM COATED ORAL
Qty: 90 TABLET | Refills: 0 | Status: SHIPPED | OUTPATIENT
Start: 2021-07-20 | End: 2021-10-25

## 2021-07-26 ENCOUNTER — TELEPHONE (OUTPATIENT)
Dept: PHYSICAL THERAPY | Facility: HOSPITAL | Age: 46
End: 2021-07-26

## 2021-07-27 ENCOUNTER — MED REC SCAN ONLY (OUTPATIENT)
Dept: NEUROLOGY | Facility: CLINIC | Age: 46
End: 2021-07-27

## 2021-07-27 ENCOUNTER — OFFICE VISIT (OUTPATIENT)
Dept: PHYSICAL THERAPY | Age: 46
End: 2021-07-27
Attending: PHYSICAL MEDICINE & REHABILITATION
Payer: COMMERCIAL

## 2021-07-27 DIAGNOSIS — M79.18 RIGHT BUTTOCK PAIN: ICD-10-CM

## 2021-07-27 PROCEDURE — 97140 MANUAL THERAPY 1/> REGIONS: CPT | Performed by: PHYSICAL THERAPIST

## 2021-07-27 PROCEDURE — 97110 THERAPEUTIC EXERCISES: CPT | Performed by: PHYSICAL THERAPIST

## 2021-07-27 NOTE — PROGRESS NOTES
Dx: Right buttock pain (M79.18)         Authorized # of Visits:  8         Next MD visit: none scheduled  Fall Risk: standard         Precautions: n/a           Medication Changes since last visit?: No     Subjective: recent SIJ injection R side, some reli

## 2021-07-29 ENCOUNTER — OFFICE VISIT (OUTPATIENT)
Dept: PHYSICAL THERAPY | Age: 46
End: 2021-07-29
Attending: PHYSICAL MEDICINE & REHABILITATION
Payer: COMMERCIAL

## 2021-07-29 PROCEDURE — 97140 MANUAL THERAPY 1/> REGIONS: CPT | Performed by: PHYSICAL THERAPIST

## 2021-07-29 PROCEDURE — 97110 THERAPEUTIC EXERCISES: CPT | Performed by: PHYSICAL THERAPIST

## 2021-07-29 NOTE — PROGRESS NOTES
Dx: Right buttock pain (M79.18)         Authorized # of Visits:  8         Next MD visit: none scheduled  Fall Risk: standard         Precautions: n/a           Medication Changes since last visit?: No     Subjective: managing pain better at work with new Services: TE, MT    Charges: TE2, MT1       Total Timed Treatment: 45 min  Total Treatment Time: 47 min

## 2021-08-03 ENCOUNTER — OFFICE VISIT (OUTPATIENT)
Dept: PHYSICAL THERAPY | Age: 46
End: 2021-08-03
Attending: PHYSICAL MEDICINE & REHABILITATION
Payer: COMMERCIAL

## 2021-08-03 PROCEDURE — 97140 MANUAL THERAPY 1/> REGIONS: CPT | Performed by: PHYSICAL THERAPIST

## 2021-08-03 PROCEDURE — 97110 THERAPEUTIC EXERCISES: CPT | Performed by: PHYSICAL THERAPIST

## 2021-08-03 NOTE — PROGRESS NOTES
Dx: Right buttock pain (M79.18)         Authorized # of Visits:  8         Next MD visit: none scheduled  Fall Risk: standard         Precautions: n/a           Medication Changes since last visit?: No     Subjective: intermittent pain with flexion at work symptoms at onset with HEP  3) pain free with work related activities  4) no pain with housework such as laundry, dishes  5) restore full pain free lumbar ROM with flexion and extension  6) Pt to demonstrate independence with self management as needed    P

## 2021-08-05 ENCOUNTER — OFFICE VISIT (OUTPATIENT)
Dept: PHYSICAL THERAPY | Age: 46
End: 2021-08-05
Attending: PHYSICAL MEDICINE & REHABILITATION
Payer: COMMERCIAL

## 2021-08-05 PROCEDURE — 97110 THERAPEUTIC EXERCISES: CPT | Performed by: PHYSICAL THERAPIST

## 2021-08-05 PROCEDURE — 97140 MANUAL THERAPY 1/> REGIONS: CPT | Performed by: PHYSICAL THERAPIST

## 2021-08-05 NOTE — PROGRESS NOTES
Dx: Right buttock pain (M79.18)         Authorized # of Visits:  8         Next MD visit: none scheduled  Fall Risk: standard         Precautions: n/a           Medication Changes since last visit?: No     Subjective: feeling pretty good so far this am. Ta irritability.       Goals:   1) no pain with sitting/standing for 10+ minutes  2) pt able to abolish symptoms at onset with HEP  3) pain free with work related activities  4) no pain with housework such as laundry, dishes  5) restore full pain free lumbar R

## 2021-08-24 ENCOUNTER — APPOINTMENT (OUTPATIENT)
Dept: PHYSICAL THERAPY | Age: 46
End: 2021-08-24
Attending: PHYSICAL MEDICINE & REHABILITATION
Payer: COMMERCIAL

## 2021-08-25 ENCOUNTER — PATIENT MESSAGE (OUTPATIENT)
Dept: INTERNAL MEDICINE CLINIC | Facility: CLINIC | Age: 46
End: 2021-08-25

## 2021-08-25 DIAGNOSIS — Z11.52 ENCOUNTER FOR SCREENING FOR COVID-19: Primary | ICD-10-CM

## 2021-08-25 NOTE — TELEPHONE ENCOUNTER
From: Harlan Jacob  To:  Lillian Severino MD  Sent: 8/25/2021 6:00 AM CDT  Subject: Other    Good morning ,  Yesterday i have running nose and sneezing, i took Zyrtec but it doesn’t seem to help until later the night, developed headache,

## 2021-08-26 ENCOUNTER — APPOINTMENT (OUTPATIENT)
Dept: PHYSICAL THERAPY | Age: 46
End: 2021-08-26
Attending: PHYSICAL MEDICINE & REHABILITATION
Payer: COMMERCIAL

## 2021-08-31 ENCOUNTER — OFFICE VISIT (OUTPATIENT)
Dept: PHYSICAL THERAPY | Age: 46
End: 2021-08-31
Attending: PHYSICAL MEDICINE & REHABILITATION
Payer: COMMERCIAL

## 2021-08-31 PROCEDURE — 97110 THERAPEUTIC EXERCISES: CPT | Performed by: PHYSICAL THERAPIST

## 2021-08-31 PROCEDURE — 97140 MANUAL THERAPY 1/> REGIONS: CPT | Performed by: PHYSICAL THERAPIST

## 2021-08-31 NOTE — PROGRESS NOTES
Dx: Right buttock pain (M79.18)         Authorized # of Visits:  8         Next MD visit: none scheduled  Fall Risk: standard         Precautions: n/a           Medication Changes since last visit?: No     Subjective: feeling pretty good so far this am, HE Man ext mobs Lspine x 5 min Man ext mobs Lspine x 5 min     Man rot mobs in ext Lspine x 3 min Man rot mobs in ext Lspine x 10 min Man rot mobs in ext Lspine x 7 min Man rot mobs in ext Lspine x 7 min Man rot mobs in ext Lspine x 7 min       4 Point cat/ca

## 2021-09-02 ENCOUNTER — APPOINTMENT (OUTPATIENT)
Dept: PHYSICAL THERAPY | Age: 46
End: 2021-09-02
Attending: PHYSICAL MEDICINE & REHABILITATION
Payer: COMMERCIAL

## 2021-09-02 PROCEDURE — 97110 THERAPEUTIC EXERCISES: CPT | Performed by: PHYSICAL THERAPIST

## 2021-09-02 PROCEDURE — 97140 MANUAL THERAPY 1/> REGIONS: CPT | Performed by: PHYSICAL THERAPIST

## 2021-09-02 NOTE — PROGRESS NOTES
Dx: Right buttock pain (M79.18)         Authorized # of Visits:  8         Next MD visit: none scheduled  Fall Risk: standard         Precautions: n/a           Medication Changes since last visit?: No     Subjective: feeling pretty good after working this 5 min Man ext mobs Lspine x 5 min Man ext mobs Lspine x 5 min Man ext mobs Lspine x 5 min Man ext mobs Lspine x 5 min Man ext mobs Lspine x 5 min    Man rot mobs in ext Lspine x 3 min Man rot mobs in ext Lspine x 10 min Man rot mobs in ext Lspine x 7 min M

## 2021-09-07 ENCOUNTER — APPOINTMENT (OUTPATIENT)
Dept: PHYSICAL THERAPY | Age: 46
End: 2021-09-07
Attending: PHYSICAL MEDICINE & REHABILITATION
Payer: COMMERCIAL

## 2021-09-07 ENCOUNTER — TELEPHONE (OUTPATIENT)
Dept: PHYSICAL THERAPY | Facility: HOSPITAL | Age: 46
End: 2021-09-07

## 2021-09-09 ENCOUNTER — APPOINTMENT (OUTPATIENT)
Dept: PHYSICAL THERAPY | Age: 46
End: 2021-09-09
Attending: PHYSICAL MEDICINE & REHABILITATION
Payer: COMMERCIAL

## 2021-09-14 ENCOUNTER — APPOINTMENT (OUTPATIENT)
Dept: PHYSICAL THERAPY | Age: 46
End: 2021-09-14
Attending: PHYSICAL MEDICINE & REHABILITATION
Payer: COMMERCIAL

## 2021-09-17 RX ORDER — FAMOTIDINE 20 MG/1
TABLET ORAL
Qty: 90 TABLET | Refills: 0 | Status: SHIPPED | OUTPATIENT
Start: 2021-09-17 | End: 2021-12-20

## 2021-09-17 NOTE — TELEPHONE ENCOUNTER
Medication(s) to Refill:   Requested Prescriptions     Pending Prescriptions Disp Refills   • FAMOTIDINE 20 MG Oral Tab [Pharmacy Med Name: FAMOTIDINE 20 MG TABLET] 90 tablet 0     Sig: TAKE 1 TABLET BY MOUTH EVERY DAY *NOT COVERED       LOV: 3/2/2021

## 2021-09-23 ENCOUNTER — APPOINTMENT (OUTPATIENT)
Dept: PHYSICAL THERAPY | Age: 46
End: 2021-09-23
Attending: PHYSICAL MEDICINE & REHABILITATION
Payer: COMMERCIAL

## 2021-10-25 DIAGNOSIS — E78.00 PURE HYPERCHOLESTEROLEMIA: ICD-10-CM

## 2021-10-26 RX ORDER — ATORVASTATIN CALCIUM 10 MG/1
TABLET, FILM COATED ORAL
Qty: 90 TABLET | Refills: 0 | Status: SHIPPED | OUTPATIENT
Start: 2021-10-26 | End: 2022-01-31

## 2021-10-26 NOTE — TELEPHONE ENCOUNTER
Protocol passed     Requesting: atorvastatin 10mg     LOV: 3/2/21   RTC: 1 yr   Filled: 7/20/21 #90 0 refills   Recent Labs: 3/4/21     Upcoming OV: none scheduled

## 2021-12-20 RX ORDER — FAMOTIDINE 20 MG/1
TABLET ORAL
Qty: 90 TABLET | Refills: 0 | Status: SHIPPED | OUTPATIENT
Start: 2021-12-20

## 2021-12-20 NOTE — TELEPHONE ENCOUNTER
Medication(s) to Refill:   Requested Prescriptions     Pending Prescriptions Disp Refills   • FAMOTIDINE 20 MG Oral Tab [Pharmacy Med Name: FAMOTIDINE 20 MG TABLET] 90 tablet 0     Sig: TAKE 1 TABLET BY MOUTH EVERY DAY *NOT COVERED       LOV:  3-2-2021

## 2022-01-30 DIAGNOSIS — E78.00 PURE HYPERCHOLESTEROLEMIA: ICD-10-CM

## 2022-01-31 RX ORDER — ATORVASTATIN CALCIUM 10 MG/1
TABLET, FILM COATED ORAL
Qty: 90 TABLET | Refills: 0 | Status: SHIPPED | OUTPATIENT
Start: 2022-01-31

## 2022-02-04 ENCOUNTER — OFFICE VISIT (OUTPATIENT)
Dept: INTERNAL MEDICINE CLINIC | Facility: CLINIC | Age: 47
End: 2022-02-04
Payer: COMMERCIAL

## 2022-02-04 ENCOUNTER — TELEPHONE (OUTPATIENT)
Dept: INTERNAL MEDICINE CLINIC | Facility: CLINIC | Age: 47
End: 2022-02-04

## 2022-02-04 VITALS
TEMPERATURE: 98 F | HEART RATE: 78 BPM | RESPIRATION RATE: 16 BRPM | SYSTOLIC BLOOD PRESSURE: 138 MMHG | WEIGHT: 155.38 LBS | HEIGHT: 62 IN | OXYGEN SATURATION: 99 % | DIASTOLIC BLOOD PRESSURE: 84 MMHG | BODY MASS INDEX: 28.59 KG/M2

## 2022-02-04 DIAGNOSIS — N30.01 ACUTE CYSTITIS WITH HEMATURIA: Primary | ICD-10-CM

## 2022-02-04 LAB
BILIRUB UR QL STRIP.AUTO: NEGATIVE
COLOR UR AUTO: YELLOW
GLUCOSE UR STRIP.AUTO-MCNC: NEGATIVE MG/DL
KETONES UR STRIP.AUTO-MCNC: NEGATIVE MG/DL
PH UR STRIP.AUTO: 7 [PH] (ref 5–8)
PROT UR STRIP.AUTO-MCNC: 30 MG/DL
RBC #/AREA URNS AUTO: >10 /HPF
SP GR UR STRIP.AUTO: 1.02 (ref 1–1.03)
UROBILINOGEN UR STRIP.AUTO-MCNC: <2 MG/DL
WBC #/AREA URNS AUTO: >50 /HPF
WBC CLUMPS UR QL AUTO: PRESENT /HPF

## 2022-02-04 PROCEDURE — 87086 URINE CULTURE/COLONY COUNT: CPT | Performed by: INTERNAL MEDICINE

## 2022-02-04 PROCEDURE — 99213 OFFICE O/P EST LOW 20 MIN: CPT | Performed by: INTERNAL MEDICINE

## 2022-02-04 PROCEDURE — 3079F DIAST BP 80-89 MM HG: CPT | Performed by: INTERNAL MEDICINE

## 2022-02-04 PROCEDURE — 81001 URINALYSIS AUTO W/SCOPE: CPT | Performed by: INTERNAL MEDICINE

## 2022-02-04 PROCEDURE — 87077 CULTURE AEROBIC IDENTIFY: CPT | Performed by: INTERNAL MEDICINE

## 2022-02-04 PROCEDURE — 87186 SC STD MICRODIL/AGAR DIL: CPT | Performed by: INTERNAL MEDICINE

## 2022-02-04 PROCEDURE — 3075F SYST BP GE 130 - 139MM HG: CPT | Performed by: INTERNAL MEDICINE

## 2022-02-04 PROCEDURE — 3008F BODY MASS INDEX DOCD: CPT | Performed by: INTERNAL MEDICINE

## 2022-02-04 RX ORDER — CLOBETASOL PROPIONATE 0.5 MG/G
CREAM TOPICAL AS NEEDED
COMMUNITY
Start: 2021-10-07

## 2022-02-04 RX ORDER — SULFAMETHOXAZOLE AND TRIMETHOPRIM 800; 160 MG/1; MG/1
1 TABLET ORAL 2 TIMES DAILY
Qty: 20 TABLET | Refills: 0 | Status: SHIPPED | OUTPATIENT
Start: 2022-02-04 | End: 2022-02-14

## 2022-02-04 NOTE — TELEPHONE ENCOUNTER
Pt was seen in office today for UTI and thought Dr Junior Guerra would be prescribing Pyridium in addition to Bactrim. Pt is requesting prescription to be sent to [place].      [id number] [place], [address] [phone], [phone]

## 2022-02-06 RX ORDER — PHENAZOPYRIDINE HYDROCHLORIDE 200 MG/1
200 TABLET, FILM COATED ORAL 3 TIMES DAILY PRN
Qty: 10 TABLET | Refills: 0 | Status: SHIPPED | OUTPATIENT
Start: 2022-02-06 | End: 2022-02-09

## 2022-02-07 ENCOUNTER — TELEPHONE (OUTPATIENT)
Dept: INTERNAL MEDICINE CLINIC | Facility: CLINIC | Age: 47
End: 2022-02-07

## 2022-02-07 RX ORDER — CHOLESTYRAMINE 4 G/9G
4 POWDER, FOR SUSPENSION ORAL 2 TIMES DAILY
Qty: 60 PACKET | Refills: 3 | Status: SHIPPED | OUTPATIENT
Start: 2022-02-07 | End: 2022-03-09

## 2022-02-07 NOTE — TELEPHONE ENCOUNTER
Patient complains of diarrhea after she has a cholecystectomy. Prior to that she had no symptoms. Patient wants a trial of Questran. That medicine has been sent.   Please let the patient know  Please let the patient know we started off on a low dose if we have to operate we will but have sent a 30-day trial.

## 2022-02-09 ENCOUNTER — TELEPHONE (OUTPATIENT)
Dept: INTERNAL MEDICINE CLINIC | Facility: CLINIC | Age: 47
End: 2022-02-09

## 2022-02-09 NOTE — TELEPHONE ENCOUNTER
----- Message from Saud Ca MD sent at 2/7/2022  9:37 AM CST -----  Reviewed results   Findings consistent with UTI. Patient should finish her antibiotics. Repeat urinalysis after treatment.   Please place the order

## 2022-02-13 ENCOUNTER — HOSPITAL ENCOUNTER (OUTPATIENT)
Age: 47
Discharge: HOME | End: 2022-02-13
Payer: COMMERCIAL

## 2022-02-13 VITALS
TEMPERATURE: 98 F | RESPIRATION RATE: 16 BRPM | WEIGHT: 152 LBS | HEIGHT: 62 IN | BODY MASS INDEX: 27.97 KG/M2 | SYSTOLIC BLOOD PRESSURE: 138 MMHG | HEART RATE: 92 BPM | OXYGEN SATURATION: 98 % | DIASTOLIC BLOOD PRESSURE: 73 MMHG

## 2022-02-13 DIAGNOSIS — T78.40XA ALLERGIC REACTION, INITIAL ENCOUNTER: Primary | ICD-10-CM

## 2022-02-13 LAB
POCT BILIRUBIN URINE: NEGATIVE
POCT GLUCOSE URINE: NEGATIVE MG/DL
POCT KETONE URINE: NEGATIVE MG/DL
POCT LEUKOCYTE ESTERASE URINE: NEGATIVE
POCT NITRITE URINE: NEGATIVE
POCT PH URINE: 6 (ref 5–8)
POCT PROTEIN URINE: NEGATIVE MG/DL
POCT SPECIFIC GRAVITY URINE: 1.03
POCT URINE CLARITY: CLEAR
POCT URINE COLOR: YELLOW
POCT UROBILINOGEN URINE: 0.2 MG/DL

## 2022-02-13 PROCEDURE — 96375 TX/PRO/DX INJ NEW DRUG ADDON: CPT

## 2022-02-13 PROCEDURE — 81002 URINALYSIS NONAUTO W/O SCOPE: CPT | Performed by: PHYSICIAN ASSISTANT

## 2022-02-13 PROCEDURE — 99214 OFFICE O/P EST MOD 30 MIN: CPT

## 2022-02-13 PROCEDURE — 99204 OFFICE O/P NEW MOD 45 MIN: CPT

## 2022-02-13 PROCEDURE — 96374 THER/PROPH/DIAG INJ IV PUSH: CPT

## 2022-02-13 PROCEDURE — S0028 INJECTION, FAMOTIDINE, 20 MG: HCPCS

## 2022-02-13 RX ORDER — DIPHENHYDRAMINE HYDROCHLORIDE 50 MG/ML
25 INJECTION INTRAMUSCULAR; INTRAVENOUS ONCE
Status: COMPLETED | OUTPATIENT
Start: 2022-02-13 | End: 2022-02-13

## 2022-02-13 RX ORDER — PREDNISONE 20 MG/1
40 TABLET ORAL DAILY
Qty: 8 TABLET | Refills: 0 | Status: SHIPPED | OUTPATIENT
Start: 2022-02-13 | End: 2022-02-17

## 2022-02-13 RX ORDER — FAMOTIDINE 10 MG/ML
20 INJECTION, SOLUTION INTRAVENOUS ONCE
Status: COMPLETED | OUTPATIENT
Start: 2022-02-13 | End: 2022-02-13

## 2022-02-13 RX ORDER — FAMOTIDINE 10 MG/ML
20 INJECTION, SOLUTION INTRAVENOUS ONCE
Status: DISCONTINUED | OUTPATIENT
Start: 2022-02-13 | End: 2022-02-13

## 2022-02-13 RX ORDER — METHYLPREDNISOLONE SODIUM SUCCINATE 125 MG/2ML
125 INJECTION, POWDER, LYOPHILIZED, FOR SOLUTION INTRAMUSCULAR; INTRAVENOUS ONCE
Status: COMPLETED | OUTPATIENT
Start: 2022-02-13 | End: 2022-02-13

## 2022-02-13 NOTE — ED INITIAL ASSESSMENT (HPI)
C/o rashes started last night to right upper arm and progressively spreading-all over the body. Currently taking Bactrim DS for UTI-3 doses left. Some itching. Took Bendryl last night. Home test kit for Covid yesterday with Negative. Denies short of breath.

## 2022-02-15 ENCOUNTER — LAB ENCOUNTER (OUTPATIENT)
Dept: LAB | Age: 47
End: 2022-02-15
Attending: INTERNAL MEDICINE
Payer: COMMERCIAL

## 2022-02-15 PROCEDURE — 87086 URINE CULTURE/COLONY COUNT: CPT | Performed by: INTERNAL MEDICINE

## 2022-02-15 PROCEDURE — 81001 URINALYSIS AUTO W/SCOPE: CPT | Performed by: INTERNAL MEDICINE

## 2022-03-03 ENCOUNTER — PATIENT MESSAGE (OUTPATIENT)
Dept: NEUROLOGY | Facility: CLINIC | Age: 47
End: 2022-03-03

## 2022-03-03 NOTE — TELEPHONE ENCOUNTER
From: Ene Andino  Sent: 3/3/2022 9:45 AM CST  To: Migdalia Dias  Subject: Right hip buttocks, intense pain this early morning     I am working tomorrow, i just set it because I am running out of options. I am off today just wondering if Dr got any cancellations?

## 2022-03-04 ENCOUNTER — OFFICE VISIT (OUTPATIENT)
Dept: PHYSICAL MEDICINE AND REHAB | Facility: CLINIC | Age: 47
End: 2022-03-04
Payer: COMMERCIAL

## 2022-03-04 VITALS
SYSTOLIC BLOOD PRESSURE: 130 MMHG | DIASTOLIC BLOOD PRESSURE: 64 MMHG | WEIGHT: 152 LBS | BODY MASS INDEX: 27.97 KG/M2 | HEIGHT: 62 IN

## 2022-03-04 DIAGNOSIS — M79.18 RIGHT BUTTOCK PAIN: Primary | ICD-10-CM

## 2022-03-04 PROCEDURE — 3075F SYST BP GE 130 - 139MM HG: CPT | Performed by: PHYSICAL MEDICINE & REHABILITATION

## 2022-03-04 PROCEDURE — 3008F BODY MASS INDEX DOCD: CPT | Performed by: PHYSICAL MEDICINE & REHABILITATION

## 2022-03-04 PROCEDURE — 3078F DIAST BP <80 MM HG: CPT | Performed by: PHYSICAL MEDICINE & REHABILITATION

## 2022-03-04 PROCEDURE — 99213 OFFICE O/P EST LOW 20 MIN: CPT | Performed by: PHYSICAL MEDICINE & REHABILITATION

## 2022-03-04 RX ORDER — MELOXICAM 15 MG/1
TABLET ORAL
Qty: 30 TABLET | Refills: 0 | Status: SHIPPED | OUTPATIENT
Start: 2022-03-04

## 2022-03-04 NOTE — PATIENT INSTRUCTIONS
For the next two weeks walking exercises are probably going to be easier for you. Hold off on the [employer] bike for at least two weeks, walk outside or on a treadmill at a brisk pace if it is not overly painful.

## 2022-03-05 NOTE — PROGRESS NOTES
Progress note    C/C: right low back/buttock pain    HPI: 76-year-old female presents for follow-up. 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 She only did about 30 compressions, but began having right buttock pain that became progressive, and then when she awoke the next morning she was having quite a bit of difficulty getting out of bed. Took meloxicam 15 mg yesterday, none today. Yesterday was having pain radiating to the posterior knee, but that has gone away. No associated numbness and tingling. She has been able to move more freely, though she has increased pain with sitting and bending. Pertinent allergies: Tetracycline, Bactrim    Physical exam:  BMI 27.80. Blood pressure 130/64. Lumbar spine exam:    mild pain with lumbar flexion. Less pain with lumbar extension. No tenderness to palpation lumbar paraspinals. + ttp right PSIS. 5/5 LE strength b/l  SILT b/l LE  2/4 quad, gastrocs reflexes b/l  Facet loading negative  Seated slump test +  SLR negative bilaterally    Imaging: No new imaging to review    Assessment and plan  1. Flexion provoked right buttock pain; differential includes discogenic pain, right SIJ pain    Overall improving. She will start walking, and will avoid using a stationary bike for now. She was prescribed meloxicam 15 mg daily for the next 7 days, then daily on an as-needed basis. She has a history of acid reflux, was instructed to take medication with food to avoid exacerbation of acid reflux. She will follow-up with me in 4 weeks if the symptoms do not continue to improve.     Charisma Blair DO  Physical Medicine and [address] [place] [address]

## 2022-03-08 ENCOUNTER — PATIENT MESSAGE (OUTPATIENT)
Dept: INTERNAL MEDICINE CLINIC | Facility: CLINIC | Age: 47
End: 2022-03-08

## 2022-03-10 ENCOUNTER — OFFICE VISIT (OUTPATIENT)
Dept: INTERNAL MEDICINE CLINIC | Facility: CLINIC | Age: 47
End: 2022-03-10
Payer: COMMERCIAL

## 2022-03-10 VITALS
RESPIRATION RATE: 16 BRPM | HEIGHT: 62 IN | TEMPERATURE: 98 F | DIASTOLIC BLOOD PRESSURE: 84 MMHG | BODY MASS INDEX: 28.37 KG/M2 | OXYGEN SATURATION: 100 % | HEART RATE: 58 BPM | WEIGHT: 154.19 LBS | SYSTOLIC BLOOD PRESSURE: 122 MMHG

## 2022-03-10 DIAGNOSIS — E78.00 PURE HYPERCHOLESTEROLEMIA: ICD-10-CM

## 2022-03-10 DIAGNOSIS — Z00.00 ROUTINE GENERAL MEDICAL EXAMINATION AT A HEALTH CARE FACILITY: Primary | ICD-10-CM

## 2022-03-10 PROBLEM — G89.29 CHRONIC RIGHT-SIDED LOW BACK PAIN WITH RIGHT-SIDED SCIATICA: Chronic | Status: ACTIVE | Noted: 2019-06-25

## 2022-03-10 PROBLEM — M54.41 CHRONIC RIGHT-SIDED LOW BACK PAIN WITH RIGHT-SIDED SCIATICA: Chronic | Status: ACTIVE | Noted: 2019-06-25

## 2022-03-10 PROCEDURE — 3074F SYST BP LT 130 MM HG: CPT | Performed by: INTERNAL MEDICINE

## 2022-03-10 PROCEDURE — 3008F BODY MASS INDEX DOCD: CPT | Performed by: INTERNAL MEDICINE

## 2022-03-10 PROCEDURE — 99396 PREV VISIT EST AGE 40-64: CPT | Performed by: INTERNAL MEDICINE

## 2022-03-10 PROCEDURE — 3079F DIAST BP 80-89 MM HG: CPT | Performed by: INTERNAL MEDICINE

## 2022-03-10 RX ORDER — ATORVASTATIN CALCIUM 10 MG/1
10 TABLET, FILM COATED ORAL DAILY
Qty: 90 TABLET | Refills: 0 | Status: SHIPPED | OUTPATIENT
Start: 2022-03-10

## 2022-03-15 ENCOUNTER — LAB ENCOUNTER (OUTPATIENT)
Dept: LAB | Age: 47
End: 2022-03-15
Attending: INTERNAL MEDICINE
Payer: COMMERCIAL

## 2022-03-15 DIAGNOSIS — Z00.00 ROUTINE GENERAL MEDICAL EXAMINATION AT A HEALTH CARE FACILITY: ICD-10-CM

## 2022-03-15 LAB
ALBUMIN SERPL-MCNC: 3.9 G/DL (ref 3.4–5)
ALBUMIN/GLOB SERPL: 1.2 {RATIO} (ref 1–2)
ALP LIVER SERPL-CCNC: 55 U/L
ALT SERPL-CCNC: 23 U/L
ANION GAP SERPL CALC-SCNC: 4 MMOL/L (ref 0–18)
AST SERPL-CCNC: 14 U/L (ref 15–37)
BASOPHILS # BLD AUTO: 0.09 X10(3) UL (ref 0–0.2)
BASOPHILS NFR BLD AUTO: 1.2 %
BILIRUB SERPL-MCNC: 0.5 MG/DL (ref 0.1–2)
BILIRUB UR QL STRIP.AUTO: NEGATIVE
BUN BLD-MCNC: 13 MG/DL (ref 7–18)
CALCIUM BLD-MCNC: 8.5 MG/DL (ref 8.5–10.1)
CHLORIDE SERPL-SCNC: 108 MMOL/L (ref 98–112)
CHOLEST SERPL-MCNC: 154 MG/DL (ref ?–200)
CLARITY UR REFRACT.AUTO: CLEAR
CO2 SERPL-SCNC: 26 MMOL/L (ref 21–32)
COLOR UR AUTO: YELLOW
CREAT BLD-MCNC: 0.61 MG/DL
EOSINOPHIL # BLD AUTO: 0.35 X10(3) UL (ref 0–0.7)
EOSINOPHIL NFR BLD AUTO: 4.6 %
ERYTHROCYTE [DISTWIDTH] IN BLOOD BY AUTOMATED COUNT: 13.8 %
EST. AVERAGE GLUCOSE BLD GHB EST-MCNC: 114 MG/DL (ref 68–126)
FASTING PATIENT LIPID ANSWER: YES
FASTING STATUS PATIENT QL REPORTED: YES
GLOBULIN PLAS-MCNC: 3.3 G/DL (ref 2.8–4.4)
GLUCOSE BLD-MCNC: 93 MG/DL (ref 70–99)
GLUCOSE UR STRIP.AUTO-MCNC: NEGATIVE MG/DL
HCT VFR BLD AUTO: 42 %
HDLC SERPL-MCNC: 57 MG/DL (ref 40–59)
HGB BLD-MCNC: 13.6 G/DL
IMM GRANULOCYTES # BLD AUTO: 0.03 X10(3) UL (ref 0–1)
IMM GRANULOCYTES NFR BLD: 0.4 %
KETONES UR STRIP.AUTO-MCNC: NEGATIVE MG/DL
LDLC SERPL CALC-MCNC: 84 MG/DL (ref ?–100)
LYMPHOCYTES # BLD AUTO: 1.9 X10(3) UL (ref 1–4)
LYMPHOCYTES NFR BLD AUTO: 24.9 %
MCH RBC QN AUTO: 26.2 PG (ref 26–34)
MCHC RBC AUTO-ENTMCNC: 32.4 G/DL (ref 31–37)
MCV RBC AUTO: 80.8 FL
MONOCYTES # BLD AUTO: 0.76 X10(3) UL (ref 0.1–1)
MONOCYTES NFR BLD AUTO: 10 %
NEUTROPHILS # BLD AUTO: 4.49 X10 (3) UL (ref 1.5–7.7)
NEUTROPHILS # BLD AUTO: 4.49 X10(3) UL (ref 1.5–7.7)
NEUTROPHILS NFR BLD AUTO: 58.9 %
NITRITE UR QL STRIP.AUTO: NEGATIVE
NONHDLC SERPL-MCNC: 97 MG/DL (ref ?–130)
OSMOLALITY SERPL CALC.SUM OF ELEC: 286 MOSM/KG (ref 275–295)
PH UR STRIP.AUTO: 5 [PH] (ref 5–8)
PLATELET # BLD AUTO: 176 10(3)UL (ref 150–450)
POTASSIUM SERPL-SCNC: 3.9 MMOL/L (ref 3.5–5.1)
PROT SERPL-MCNC: 7.2 G/DL (ref 6.4–8.2)
PROT UR STRIP.AUTO-MCNC: NEGATIVE MG/DL
RBC # BLD AUTO: 5.2 X10(6)UL
SODIUM SERPL-SCNC: 138 MMOL/L (ref 136–145)
SP GR UR STRIP.AUTO: 1.02 (ref 1–1.03)
T4 SERPL-MCNC: 8.3 UG/DL
TRIGL SERPL-MCNC: 63 MG/DL (ref 30–149)
TSI SER-ACNC: 1.25 MIU/ML (ref 0.36–3.74)
UROBILINOGEN UR STRIP.AUTO-MCNC: <2 MG/DL
VIT D+METAB SERPL-MCNC: 40.4 NG/ML (ref 30–100)
VLDLC SERPL CALC-MCNC: 10 MG/DL (ref 0–30)
WBC # BLD AUTO: 7.6 X10(3) UL (ref 4–11)

## 2022-03-15 PROCEDURE — 83036 HEMOGLOBIN GLYCOSYLATED A1C: CPT | Performed by: INTERNAL MEDICINE

## 2022-03-15 PROCEDURE — 80050 GENERAL HEALTH PANEL: CPT | Performed by: INTERNAL MEDICINE

## 2022-03-15 PROCEDURE — 84436 ASSAY OF TOTAL THYROXINE: CPT | Performed by: INTERNAL MEDICINE

## 2022-03-15 PROCEDURE — 80061 LIPID PANEL: CPT | Performed by: INTERNAL MEDICINE

## 2022-03-15 PROCEDURE — 81001 URINALYSIS AUTO W/SCOPE: CPT | Performed by: INTERNAL MEDICINE

## 2022-03-15 PROCEDURE — 82306 VITAMIN D 25 HYDROXY: CPT | Performed by: INTERNAL MEDICINE

## 2022-03-22 RX ORDER — FAMOTIDINE 20 MG/1
TABLET, FILM COATED ORAL
Qty: 90 TABLET | Refills: 0 | Status: SHIPPED | OUTPATIENT
Start: 2022-03-22

## 2022-03-22 NOTE — TELEPHONE ENCOUNTER
Protocol passed     Requesting: famotidine 20mg     LOV: 3/10/22   RTC: 1 yr   Filled: 12/20/21 #90 0 refills   Recent Labs: 3/15/22     Upcoming OV: none scheduled

## 2022-03-25 DIAGNOSIS — M79.18 RIGHT BUTTOCK PAIN: ICD-10-CM

## 2022-03-25 NOTE — TELEPHONE ENCOUNTER
LOV 3/4/22. Started on meloxicam at that time. To take for 7 days then PRN. Left voice mail message for patient for update after starting meloxicam. Will pend meloxicam renewal to Dr Dorie Lehman.

## 2022-03-28 RX ORDER — MELOXICAM 15 MG/1
TABLET ORAL
Qty: 30 TABLET | Refills: 0 | OUTPATIENT
Start: 2022-03-28

## 2022-03-28 NOTE — TELEPHONE ENCOUNTER
See patient my chart message 3/28/22. Patient declines need for refill meloxicam. This encounter closed.

## 2022-03-28 NOTE — TELEPHONE ENCOUNTER
Left voice mail message for patient to return call with update on meloxicam usage prior to any refill being issued. Emerald Fuchs

## 2022-04-07 RX ORDER — FAMOTIDINE 20 MG/1
20 TABLET, FILM COATED ORAL DAILY
Qty: 90 TABLET | Refills: 0 | OUTPATIENT
Start: 2022-04-07

## 2022-04-07 NOTE — TELEPHONE ENCOUNTER
Too soon for a refill     Protocol passed   Requesting:   Famotidine 20mg     LOV: 3/10/22   RTC: 1 yr   Filled: 3/22/22 #90 0 refills
PP PEC

## 2022-05-06 RX ORDER — ATORVASTATIN CALCIUM 10 MG/1
10 TABLET, FILM COATED ORAL DAILY
Qty: 90 TABLET | Refills: 0 | Status: SHIPPED | OUTPATIENT
Start: 2022-05-06

## 2022-07-07 RX ORDER — FAMOTIDINE 20 MG/1
20 TABLET, FILM COATED ORAL DAILY
Qty: 90 TABLET | Refills: 0 | Status: SHIPPED | OUTPATIENT
Start: 2022-07-07

## 2022-08-01 DIAGNOSIS — E78.00 PURE HYPERCHOLESTEROLEMIA: ICD-10-CM

## 2022-08-02 RX ORDER — ATORVASTATIN CALCIUM 10 MG/1
10 TABLET, FILM COATED ORAL DAILY
Qty: 90 TABLET | Refills: 0 | Status: SHIPPED | OUTPATIENT
Start: 2022-08-02

## 2022-08-02 NOTE — TELEPHONE ENCOUNTER
Protocol passed     Requesting: atorvastatin 10mg     LOV: 3/10/22   RTC: 1 yr   Filled:5/6/22 #90 0 refills   Recent Labs: 3/15/22     Upcoming OV: none scheduled

## 2022-08-08 PROBLEM — Z12.11 SPECIAL SCREENING FOR MALIGNANT NEOPLASMS, COLON: Status: ACTIVE | Noted: 2022-08-08

## 2022-08-31 ENCOUNTER — OFFICE VISIT (OUTPATIENT)
Dept: PHYSICAL MEDICINE AND REHAB | Facility: CLINIC | Age: 47
End: 2022-08-31
Payer: COMMERCIAL

## 2022-08-31 VITALS
SYSTOLIC BLOOD PRESSURE: 130 MMHG | HEART RATE: 66 BPM | BODY MASS INDEX: 28.52 KG/M2 | DIASTOLIC BLOOD PRESSURE: 90 MMHG | HEIGHT: 62 IN | OXYGEN SATURATION: 98 % | WEIGHT: 155 LBS

## 2022-08-31 DIAGNOSIS — M54.50 ACUTE BILATERAL LOW BACK PAIN WITHOUT SCIATICA: Primary | ICD-10-CM

## 2022-08-31 PROCEDURE — 3080F DIAST BP >= 90 MM HG: CPT | Performed by: PHYSICAL MEDICINE & REHABILITATION

## 2022-08-31 PROCEDURE — 3008F BODY MASS INDEX DOCD: CPT | Performed by: PHYSICAL MEDICINE & REHABILITATION

## 2022-08-31 PROCEDURE — 3075F SYST BP GE 130 - 139MM HG: CPT | Performed by: PHYSICAL MEDICINE & REHABILITATION

## 2022-08-31 PROCEDURE — 99213 OFFICE O/P EST LOW 20 MIN: CPT | Performed by: PHYSICAL MEDICINE & REHABILITATION

## 2022-08-31 RX ORDER — CYCLOBENZAPRINE HCL 10 MG
10 TABLET ORAL 2 TIMES DAILY PRN
Qty: 30 TABLET | Refills: 0 | Status: SHIPPED | OUTPATIENT
Start: 2022-08-31

## 2022-08-31 RX ORDER — TRAMADOL HYDROCHLORIDE 50 MG/1
50 TABLET ORAL 2 TIMES DAILY PRN
Qty: 20 TABLET | Refills: 0 | Status: SHIPPED | OUTPATIENT
Start: 2022-08-31

## 2022-08-31 RX ORDER — PREDNISONE 20 MG/1
TABLET ORAL
Qty: 10 TABLET | Refills: 0 | Status: SHIPPED | OUTPATIENT
Start: 2022-08-31

## 2022-08-31 NOTE — PATIENT INSTRUCTIONS
Stop the meloxicam.    Start the prednisone. You may take the flexeril at night time for the next 3 nights, and after that up to twice daily as needed. Do not take it during the daytime if you are going to operate heavy machinery. If symptoms start to ease, you may resume your home exercise program or do some walking. Let me know how you are doing in 1 week. Send a message through [phone] or call the clinic with an update.

## 2022-09-07 ENCOUNTER — PATIENT MESSAGE (OUTPATIENT)
Dept: PHYSICAL MEDICINE AND REHAB | Facility: CLINIC | Age: 47
End: 2022-09-07

## 2022-09-07 DIAGNOSIS — M79.18 LEFT BUTTOCK PAIN: ICD-10-CM

## 2022-09-07 DIAGNOSIS — M54.50 ACUTE BILATERAL LOW BACK PAIN WITHOUT SCIATICA: Primary | ICD-10-CM

## 2022-09-15 ENCOUNTER — OFFICE VISIT (OUTPATIENT)
Dept: PHYSICAL THERAPY | Age: 47
End: 2022-09-15
Attending: PHYSICAL MEDICINE & REHABILITATION
Payer: COMMERCIAL

## 2022-09-15 ENCOUNTER — TELEPHONE (OUTPATIENT)
Dept: PHYSICAL THERAPY | Facility: HOSPITAL | Age: 47
End: 2022-09-15

## 2022-09-15 DIAGNOSIS — M54.50 ACUTE BILATERAL LOW BACK PAIN WITHOUT SCIATICA: ICD-10-CM

## 2022-09-15 DIAGNOSIS — M79.18 LEFT BUTTOCK PAIN: ICD-10-CM

## 2022-09-15 PROCEDURE — 97161 PT EVAL LOW COMPLEX 20 MIN: CPT | Performed by: PHYSICAL THERAPIST

## 2022-09-15 PROCEDURE — 97140 MANUAL THERAPY 1/> REGIONS: CPT | Performed by: PHYSICAL THERAPIST

## 2022-09-16 ENCOUNTER — OFFICE VISIT (OUTPATIENT)
Dept: PHYSICAL THERAPY | Age: 47
End: 2022-09-16
Attending: PHYSICAL MEDICINE & REHABILITATION
Payer: COMMERCIAL

## 2022-09-16 PROCEDURE — 97110 THERAPEUTIC EXERCISES: CPT | Performed by: PHYSICAL THERAPIST

## 2022-09-20 ENCOUNTER — OFFICE VISIT (OUTPATIENT)
Dept: PHYSICAL THERAPY | Age: 47
End: 2022-09-20
Attending: PHYSICAL MEDICINE & REHABILITATION

## 2022-09-20 PROCEDURE — 97110 THERAPEUTIC EXERCISES: CPT | Performed by: PHYSICAL THERAPIST

## 2022-09-23 ENCOUNTER — TELEPHONE (OUTPATIENT)
Dept: PHYSICAL THERAPY | Facility: HOSPITAL | Age: 47
End: 2022-09-23

## 2022-09-23 ENCOUNTER — OFFICE VISIT (OUTPATIENT)
Dept: PHYSICAL THERAPY | Age: 47
End: 2022-09-23
Attending: INTERNAL MEDICINE

## 2022-09-23 PROCEDURE — 97110 THERAPEUTIC EXERCISES: CPT | Performed by: PHYSICAL THERAPIST

## 2022-09-27 ENCOUNTER — OFFICE VISIT (OUTPATIENT)
Dept: PHYSICAL THERAPY | Age: 47
End: 2022-09-27
Attending: PHYSICAL MEDICINE & REHABILITATION

## 2022-09-27 PROCEDURE — 97110 THERAPEUTIC EXERCISES: CPT | Performed by: PHYSICAL THERAPIST

## 2022-09-30 RX ORDER — FAMOTIDINE 20 MG/1
TABLET, FILM COATED ORAL
Qty: 90 TABLET | Refills: 0 | Status: SHIPPED | OUTPATIENT
Start: 2022-09-30

## 2022-10-03 ENCOUNTER — TELEPHONE (OUTPATIENT)
Dept: PHYSICAL THERAPY | Facility: HOSPITAL | Age: 47
End: 2022-10-03

## 2022-10-06 ENCOUNTER — OFFICE VISIT (OUTPATIENT)
Dept: PHYSICAL THERAPY | Age: 47
End: 2022-10-06
Attending: PHYSICAL MEDICINE & REHABILITATION
Payer: COMMERCIAL

## 2022-10-06 PROCEDURE — 97110 THERAPEUTIC EXERCISES: CPT | Performed by: PHYSICAL THERAPIST

## 2022-10-11 ENCOUNTER — APPOINTMENT (OUTPATIENT)
Dept: PHYSICAL THERAPY | Age: 47
End: 2022-10-11
Attending: PHYSICAL MEDICINE & REHABILITATION
Payer: COMMERCIAL

## 2022-10-13 ENCOUNTER — OFFICE VISIT (OUTPATIENT)
Dept: PHYSICAL THERAPY | Age: 47
End: 2022-10-13
Attending: PHYSICAL MEDICINE & REHABILITATION
Payer: COMMERCIAL

## 2022-10-13 PROCEDURE — 97110 THERAPEUTIC EXERCISES: CPT | Performed by: PHYSICAL THERAPIST

## 2022-10-26 ENCOUNTER — TELEPHONE (OUTPATIENT)
Dept: PHYSICAL THERAPY | Facility: HOSPITAL | Age: 47
End: 2022-10-26

## 2022-10-27 ENCOUNTER — OFFICE VISIT (OUTPATIENT)
Dept: PHYSICAL THERAPY | Age: 47
End: 2022-10-27
Attending: PHYSICAL MEDICINE & REHABILITATION
Payer: COMMERCIAL

## 2022-10-27 PROCEDURE — 97140 MANUAL THERAPY 1/> REGIONS: CPT | Performed by: PHYSICAL THERAPIST

## 2022-10-27 PROCEDURE — 97110 THERAPEUTIC EXERCISES: CPT | Performed by: PHYSICAL THERAPIST

## 2022-11-03 ENCOUNTER — APPOINTMENT (OUTPATIENT)
Dept: PHYSICAL THERAPY | Age: 47
End: 2022-11-03
Attending: INTERNAL MEDICINE
Payer: COMMERCIAL

## 2022-11-08 DIAGNOSIS — E78.00 PURE HYPERCHOLESTEROLEMIA: ICD-10-CM

## 2022-11-08 RX ORDER — ATORVASTATIN CALCIUM 10 MG/1
10 TABLET, FILM COATED ORAL DAILY
Qty: 90 TABLET | Refills: 0 | Status: SHIPPED | OUTPATIENT
Start: 2022-11-08

## 2022-12-29 RX ORDER — FAMOTIDINE 20 MG/1
TABLET, FILM COATED ORAL
Qty: 90 TABLET | Refills: 0 | Status: SHIPPED | OUTPATIENT
Start: 2022-12-29

## 2022-12-29 NOTE — TELEPHONE ENCOUNTER
LOV: 3/10/2022 with Dr. Lori Vasquez  RTC: 1 year  Last Relevant Labs: 3/15/2022  Filled: 9/30/2022    #90 with 0 refills    No future appointments.

## 2023-01-11 ENCOUNTER — OFFICE VISIT (OUTPATIENT)
Dept: PHYSICAL MEDICINE AND REHAB | Facility: CLINIC | Age: 48
End: 2023-01-11
Payer: COMMERCIAL

## 2023-01-11 VITALS
DIASTOLIC BLOOD PRESSURE: 90 MMHG | HEIGHT: 62 IN | BODY MASS INDEX: 28.52 KG/M2 | WEIGHT: 155 LBS | SYSTOLIC BLOOD PRESSURE: 130 MMHG

## 2023-01-11 DIAGNOSIS — M54.16 LEFT LUMBAR RADICULITIS: Primary | ICD-10-CM

## 2023-01-11 PROCEDURE — 3080F DIAST BP >= 90 MM HG: CPT | Performed by: PHYSICAL MEDICINE & REHABILITATION

## 2023-01-11 PROCEDURE — 3008F BODY MASS INDEX DOCD: CPT | Performed by: PHYSICAL MEDICINE & REHABILITATION

## 2023-01-11 PROCEDURE — 99213 OFFICE O/P EST LOW 20 MIN: CPT | Performed by: PHYSICAL MEDICINE & REHABILITATION

## 2023-01-11 PROCEDURE — 3075F SYST BP GE 130 - 139MM HG: CPT | Performed by: PHYSICAL MEDICINE & REHABILITATION

## 2023-01-11 RX ORDER — GABAPENTIN 100 MG/1
CAPSULE ORAL
Qty: 120 CAPSULE | Refills: 0 | Status: SHIPPED | OUTPATIENT
Start: 2023-01-11

## 2023-01-11 RX ORDER — TRAMADOL HYDROCHLORIDE 50 MG/1
50 TABLET ORAL 2 TIMES DAILY PRN
Qty: 14 TABLET | Refills: 0 | Status: SHIPPED | OUTPATIENT
Start: 2023-01-11

## 2023-01-11 NOTE — PATIENT INSTRUCTIONS
If the MRI cannot be done through [facility] the other option is [facility] in [facility]. They are affiliated with [facility] and is an outside institution.

## 2023-01-17 ENCOUNTER — HOSPITAL ENCOUNTER (OUTPATIENT)
Dept: MRI IMAGING | Facility: HOSPITAL | Age: 48
Discharge: HOME | End: 2023-01-17
Attending: PHYSICAL MEDICINE & REHABILITATION
Payer: COMMERCIAL

## 2023-01-17 DIAGNOSIS — M54.16 LEFT LUMBAR RADICULITIS: ICD-10-CM

## 2023-01-17 PROCEDURE — 72148 MRI LUMBAR SPINE W/O DYE: CPT | Performed by: PHYSICAL MEDICINE & REHABILITATION

## 2023-01-18 ENCOUNTER — PATIENT MESSAGE (OUTPATIENT)
Dept: PHYSICAL MEDICINE AND REHAB | Facility: CLINIC | Age: 48
End: 2023-01-18

## 2023-01-19 RX ORDER — NAPROXEN 500 MG/1
500 TABLET ORAL
Qty: 30 TABLET | Refills: 0 | Status: SHIPPED | OUTPATIENT
Start: 2023-01-19

## 2023-01-19 NOTE — TELEPHONE ENCOUNTER
LVMTCB    Per Dr. April Trinidad, \"See if this patient can tolerate either naproxen 500mg or celebrex 100mg to take in the daytime. She has significant GERD, so if this does not work continue the gabapentin and tramadol PRN pain, and I will do L5-S1 ILESI upon my return. Please put in an order for me to sign off on if she agrees to have the ILESI when I come back if still symtomatic; this way we can get her on the schedule. If she wants to try either celebrex or naproxen please send 30 tablets to pharmacy, 1 tab or capsule daily of whatever one she has not tried and is open to trying. \"

## 2023-01-19 NOTE — TELEPHONE ENCOUNTER
Okay to continue 2 tabs at nighttime.     Katey Mckenna DO, FAAPMR & CAQSM  Physical Medicine and Rehabilitation/Sports Medicine  [facility]

## 2023-01-19 NOTE — TELEPHONE ENCOUNTER
MRI reviewed, no difference since 2021. Quite small disc bulge. Is there an NSAID that doesn't give her SE, perhaps celebrex or naproxen she can take in the daytime? If no better upon my return recommend L5-S1 ILESI.

## 2023-01-19 NOTE — TELEPHONE ENCOUNTER
Spoke with patient and relayed Dr. Shruthi Lopez message to her. States she will try the Naproxen and she would like to hold off on the L5-S1 ILESI. Naproxen sent to patient's preferred pharmacy. Her pain has gotten slightly better since starting the gabapentin. Patient would like to let Dr. Chris Delgado know that she is taking 2 capsules of gabapentin at bedtime since she was having some dizziness with her daytime dose. States she has not had any issues with taking it at bedtime. She would like to know if she is okay to keep taking only at bedtime.

## 2023-02-05 DIAGNOSIS — E78.00 PURE HYPERCHOLESTEROLEMIA: ICD-10-CM

## 2023-02-06 RX ORDER — ATORVASTATIN CALCIUM 10 MG/1
TABLET, FILM COATED ORAL
Qty: 90 TABLET | Refills: 0 | Status: SHIPPED | OUTPATIENT
Start: 2023-02-06

## 2023-02-06 NOTE — TELEPHONE ENCOUNTER
Protocol passed     Requesting: atorvastatin 10mg     LOV: 3/10/22   RTC: 1 yr   Filled: 11/8/22 # 90 0 refills   Recent Labs: 3/15/22     Upcoming OV: 3/23/23

## 2023-02-15 RX ORDER — NAPROXEN 500 MG/1
TABLET ORAL
Qty: 30 TABLET | Refills: 0 | Status: SHIPPED | OUTPATIENT
Start: 2023-02-15

## 2023-02-15 NOTE — TELEPHONE ENCOUNTER
Pt. states she is taking Naproxen 1 po q am after breakfast. No side effects, GI upset.     Please review and sign if appropriate

## 2023-02-15 NOTE — TELEPHONE ENCOUNTER
Refill Request    Medication request: naproxen 500 MG Oral Tab  Take 1 tablet (500 mg total) by mouth daily with food    LOV:1/11/2023 Liz Fair DO   Due back to clinic per last office note:    Azael Nunez: Visit date not found      [place]: 01/19/23 #30    Urine drug screen (if applicable): na     Pain contract: na    LOV plan (if weaning or changing medications): Per Dr. Tamia Shafer  \"NSAIDs significantly aggravate GERD symptoms and will be avoided. \"    Per Dr. Tamia Shafer on 01/19/23: Leatha  if this patient can tolerate either naproxen 500mg or celebrex 100mg to take in the daytime. She has significant GERD, so if this does not work continue the gabapentin and tramadol PRN pain, and I will do L5-S1 ILESI upon my return. Please put in an order for me to sign off on if she agrees to have the ILESI when I come back if still symtomatic; this way we can get her on the schedule. If she wants to try either celebrex or naproxen please send 30 tablets to pharmacy, 1 tab or capsule daily of whatever one she has not tried and is open to trying. \"  Cecilia Brown

## 2023-03-07 DIAGNOSIS — M54.16 LEFT LUMBAR RADICULITIS: ICD-10-CM

## 2023-03-09 RX ORDER — GABAPENTIN 100 MG/1
200 CAPSULE ORAL NIGHTLY
Qty: 180 CAPSULE | Refills: 0 | Status: SHIPPED | OUTPATIENT
Start: 2023-03-09

## 2023-03-16 RX ORDER — NAPROXEN 500 MG/1
TABLET ORAL
Qty: 30 TABLET | Refills: 0 | Status: SHIPPED | OUTPATIENT
Start: 2023-03-16

## 2023-03-16 NOTE — TELEPHONE ENCOUNTER
Refill Request    Medication request: NAPROXEN 500 MG Oral Tab  TAKE 1 TABLET BY MOUTH DAILY WITH FOOD.    LOV:1/11/2023 Cody Carmona,    Due back to clinic per last office note:  \"Follow up after MRI is done. \"  (MRI done on 1/17/2023)  NOV: Visit date not found      [place]: 2/15/2023 #30    Urine drug screen (if applicable): None  Pain contract: None    LOV plan (if weaning or changing medications): Per Dr. Sharon Durant note: \"NSAIDs significantly aggravate GERD symptoms and will be avoided. \"      No other mention, but patient did get a refill on 2/15/2023, at that time, patient reported:  \"Pt. states she is taking Naproxen 1 po q am after breakfast. No side effects, GI upset. \"

## 2023-03-27 ENCOUNTER — OFFICE VISIT (OUTPATIENT)
Dept: INTERNAL MEDICINE CLINIC | Facility: CLINIC | Age: 48
End: 2023-03-27
Payer: COMMERCIAL

## 2023-03-27 ENCOUNTER — PATIENT MESSAGE (OUTPATIENT)
Dept: INTERNAL MEDICINE CLINIC | Facility: CLINIC | Age: 48
End: 2023-03-27

## 2023-03-27 VITALS
OXYGEN SATURATION: 99 % | BODY MASS INDEX: 27.23 KG/M2 | SYSTOLIC BLOOD PRESSURE: 112 MMHG | RESPIRATION RATE: 16 BRPM | HEART RATE: 74 BPM | WEIGHT: 148 LBS | TEMPERATURE: 98 F | HEIGHT: 62 IN | DIASTOLIC BLOOD PRESSURE: 84 MMHG

## 2023-03-27 DIAGNOSIS — Z00.00 ROUTINE GENERAL MEDICAL EXAMINATION AT A HEALTH CARE FACILITY: Primary | ICD-10-CM

## 2023-03-27 DIAGNOSIS — E78.00 PURE HYPERCHOLESTEROLEMIA: Chronic | ICD-10-CM

## 2023-03-27 PROBLEM — Z12.11 SPECIAL SCREENING FOR MALIGNANT NEOPLASMS, COLON: Status: RESOLVED | Noted: 2022-08-08 | Resolved: 2023-03-27

## 2023-03-27 PROCEDURE — 99396 PREV VISIT EST AGE 40-64: CPT | Performed by: INTERNAL MEDICINE

## 2023-03-27 PROCEDURE — 3079F DIAST BP 80-89 MM HG: CPT | Performed by: INTERNAL MEDICINE

## 2023-03-27 PROCEDURE — 3074F SYST BP LT 130 MM HG: CPT | Performed by: INTERNAL MEDICINE

## 2023-03-27 PROCEDURE — 3008F BODY MASS INDEX DOCD: CPT | Performed by: INTERNAL MEDICINE

## 2023-03-28 RX ORDER — FAMOTIDINE 20 MG/1
TABLET, FILM COATED ORAL
Qty: 90 TABLET | Refills: 0 | Status: SHIPPED | OUTPATIENT
Start: 2023-03-28

## 2023-03-28 NOTE — TELEPHONE ENCOUNTER
Protocol passed     Requesting: famotidine 20mg     LOV: 3/27/23   RTC: 1 yr   Filled: 12/29/22 # 90 0 refills   Recent Labs: 3/15/22     Upcoming OV: none scheduled

## 2023-03-28 NOTE — TELEPHONE ENCOUNTER
From: Harlan Jacob  To: Maru Lovelace MD  Sent: 3/27/2023 7:53 PM CDT  Subject: re: needed test    Dr Marlen Pritchard,   As I understood , you said EKG test, but the order that was entered was mammography. I am done with it and handed you the results.    Thanks,   Harlan

## 2023-03-31 LAB
ABSOLUTE BASOPHILS: 83 CELLS/UL (ref 0–200)
ABSOLUTE EOSINOPHILS: 352 CELLS/UL (ref 15–500)
ABSOLUTE LYMPHOCYTES: 2160 CELLS/UL (ref 850–3900)
ABSOLUTE MONOCYTES: 607 CELLS/UL (ref 200–950)
ABSOLUTE NEUTROPHILS: 3698 CELLS/UL (ref 1500–7800)
ALBUMIN/GLOBULIN RATIO: 2 (CALC) (ref 1–2.5)
ALBUMIN: 4.5 G/DL (ref 3.6–5.1)
ALKALINE PHOSPHATASE: 49 U/L (ref 31–125)
ALT: 19 U/L (ref 6–29)
APPEARANCE: CLEAR
AST: 17 U/L (ref 10–35)
BASOPHILS: 1.2 %
BILIRUBIN, TOTAL: 0.6 MG/DL (ref 0.2–1.2)
BILIRUBIN: NEGATIVE
BUN: 18 MG/DL (ref 7–25)
CALCIUM: 9.2 MG/DL (ref 8.6–10.2)
CARBON DIOXIDE: 24 MMOL/L (ref 20–32)
CHLORIDE: 108 MMOL/L (ref 98–110)
CHOL/HDLC RATIO: 2.6 (CALC)
CHOLESTEROL, TOTAL: 164 MG/DL
COLOR: YELLOW
CREATININE: 0.6 MG/DL (ref 0.5–0.99)
EGFR: 111 ML/MIN/1.73M2
EOSINOPHILS: 5.1 %
GLOBULIN: 2.2 G/DL (CALC) (ref 1.9–3.7)
GLUCOSE: 91 MG/DL (ref 65–99)
GLUCOSE: NEGATIVE
HDL CHOLESTEROL: 64 MG/DL
HEMATOCRIT: 41.6 % (ref 35–45)
HEMOGLOBIN A1C: 5.4 % OF TOTAL HGB
HEMOGLOBIN: 13.8 G/DL (ref 11.7–15.5)
KETONES: NEGATIVE
LDL-CHOLESTEROL: 85 MG/DL (CALC)
LEUKOCYTE ESTERASE: NEGATIVE
LYMPHOCYTES: 31.3 %
MCH: 26.8 PG (ref 27–33)
MCHC: 33.2 G/DL (ref 32–36)
MCV: 80.8 FL (ref 80–100)
MONOCYTES: 8.8 %
MPV: 13.1 FL (ref 7.5–12.5)
NEUTROPHILS: 53.6 %
NITRITE: NEGATIVE
NON-HDL CHOLESTEROL: 100 MG/DL (CALC)
OCCULT BLOOD: NEGATIVE
PH: 5.5 (ref 5–8)
PLATELET COUNT: 148 THOUSAND/UL (ref 140–400)
POTASSIUM: 4.2 MMOL/L (ref 3.5–5.3)
PROTEIN, TOTAL: 6.7 G/DL (ref 6.1–8.1)
PROTEIN: NEGATIVE
RDW: 13.5 % (ref 11–15)
RED BLOOD CELL COUNT: 5.15 MILLION/UL (ref 3.8–5.1)
SODIUM: 140 MMOL/L (ref 135–146)
SPECIFIC GRAVITY: 1.02 (ref 1–1.03)
TRIGLYCERIDES: 66 MG/DL
TSH: 1.85 MIU/L
WHITE BLOOD CELL COUNT: 6.9 THOUSAND/UL (ref 3.8–10.8)

## 2023-04-05 ENCOUNTER — PATIENT MESSAGE (OUTPATIENT)
Dept: INTERNAL MEDICINE CLINIC | Facility: CLINIC | Age: 48
End: 2023-04-05

## 2023-04-06 NOTE — TELEPHONE ENCOUNTER
From: Harlan Jacob  To:  Smita Tapia MD  Sent: 4/5/2023 3:49 PM CDT  Subject: Vaccine record    Attached is my Tdap vaccination record, kindly update, thank you

## 2023-04-28 ENCOUNTER — IMMUNIZATION (OUTPATIENT)
Dept: LAB | Age: 48
End: 2023-04-28
Attending: EMERGENCY MEDICINE
Payer: COMMERCIAL

## 2023-04-28 DIAGNOSIS — Z23 NEED FOR VACCINATION: Primary | ICD-10-CM

## 2023-04-28 PROCEDURE — 0134A SARSCOV2 VAC BVL 50MCG/0.5ML: CPT

## 2023-05-04 DIAGNOSIS — E78.00 PURE HYPERCHOLESTEROLEMIA: ICD-10-CM

## 2023-05-04 RX ORDER — FAMOTIDINE 20 MG/1
20 TABLET, FILM COATED ORAL DAILY
Qty: 90 TABLET | Refills: 0 | Status: CANCELLED | OUTPATIENT
Start: 2023-05-04

## 2023-05-05 RX ORDER — ATORVASTATIN CALCIUM 10 MG/1
10 TABLET, FILM COATED ORAL EVERY MORNING
Qty: 90 TABLET | Refills: 0 | Status: SHIPPED | OUTPATIENT
Start: 2023-05-05

## 2023-05-29 DIAGNOSIS — M54.16 LEFT LUMBAR RADICULITIS: ICD-10-CM

## 2023-05-30 RX ORDER — GABAPENTIN 100 MG/1
200 CAPSULE ORAL NIGHTLY
Qty: 180 CAPSULE | Refills: 0 | Status: SHIPPED | OUTPATIENT
Start: 2023-05-30

## 2023-05-30 NOTE — TELEPHONE ENCOUNTER
Refill Request    Medication request: gabapentin 100 MG Oral Cap    LOV: 1/11/2023 Elizabeth Bradley DO   RTC: N/A  NOV: Visit date not found      [place]: 3/9/2023 #90 days    UDS: (if applicable): N/A  Pain contract: N/A    LOV plan (if weaning or changing medications): Okay to continue 2 tabs at nighttime.

## 2023-06-09 NOTE — TELEPHONE ENCOUNTER
----- Message from Dandy Seo DO sent at 6/7/2021  8:20 AM CDT -----  MRI of the lumbar spine essentially normal. With the results being as they are, her symptoms are probably from sacroiliac joint pain. Follow up to discuss treatment options. CM chart review; patient s/p left TSA. No discharge needs identified. CM will remain available should needs arise.

## 2023-06-22 RX ORDER — FAMOTIDINE 20 MG/1
TABLET, FILM COATED ORAL
Qty: 90 TABLET | Refills: 0 | Status: SHIPPED | OUTPATIENT
Start: 2023-06-22

## 2023-07-02 ENCOUNTER — HOSPITAL ENCOUNTER (OUTPATIENT)
Age: 48
Discharge: HOME | End: 2023-07-02
Attending: EMERGENCY MEDICINE
Payer: COMMERCIAL

## 2023-07-02 VITALS
OXYGEN SATURATION: 97 % | HEART RATE: 70 BPM | DIASTOLIC BLOOD PRESSURE: 76 MMHG | SYSTOLIC BLOOD PRESSURE: 121 MMHG | TEMPERATURE: 98 F | WEIGHT: 145 LBS | RESPIRATION RATE: 16 BRPM | BODY MASS INDEX: 27 KG/M2

## 2023-07-02 DIAGNOSIS — R10.13 EPIGASTRIC PAIN: Primary | ICD-10-CM

## 2023-07-02 DIAGNOSIS — R19.7 DIARRHEA, UNSPECIFIED TYPE: ICD-10-CM

## 2023-07-02 DIAGNOSIS — E87.6 HYPOKALEMIA: ICD-10-CM

## 2023-07-02 LAB
#MXD IC: 1.7 X10ˆ3/UL (ref 0.1–1)
B-HCG UR QL: NEGATIVE
BASOPHILS # BLD AUTO: 0.11 X10(3) UL (ref 0–0.2)
BASOPHILS NFR BLD AUTO: 1.1 %
BUN BLD-MCNC: 12 MG/DL (ref 7–18)
CHLORIDE BLD-SCNC: 103 MMOL/L (ref 98–112)
CO2 BLD-SCNC: 23 MMOL/L (ref 21–32)
CREAT BLD-MCNC: 0.8 MG/DL
EOSINOPHIL # BLD AUTO: 0.29 X10(3) UL (ref 0–0.7)
EOSINOPHIL NFR BLD AUTO: 2.9 %
ERYTHROCYTE [DISTWIDTH] IN BLOOD BY AUTOMATED COUNT: 13.4 %
GFR SERPLBLD BASED ON 1.73 SQ M-ARVRAT: 91 ML/MIN/1.73M2 (ref 60–?)
GLUCOSE BLD-MCNC: 103 MG/DL (ref 70–99)
HCT VFR BLD AUTO: 38.2 %
HCT VFR BLD AUTO: 38.5 %
HCT VFR BLD CALC: 38 %
HGB BLD-MCNC: 12.4 G/DL
HGB BLD-MCNC: 12.4 G/DL
IMM GRANULOCYTES # BLD AUTO: 0.03 X10(3) UL (ref 0–1)
IMM GRANULOCYTES NFR BLD: 0.3 %
ISTAT IONIZED CALCIUM FOR CHEM 8: 1.2 MMOL/L (ref 1.12–1.32)
LYMPHOCYTES # BLD AUTO: 2.17 X10(3) UL (ref 1–4)
LYMPHOCYTES # BLD AUTO: 2.4 X10ˆ3/UL (ref 1–4)
LYMPHOCYTES NFR BLD AUTO: 21.4 %
LYMPHOCYTES NFR BLD AUTO: 23.5 %
MCH RBC QN AUTO: 26.1 PG (ref 26–34)
MCH RBC QN AUTO: 26.2 PG (ref 26–34)
MCHC RBC AUTO-ENTMCNC: 32.2 G/DL (ref 31–37)
MCHC RBC AUTO-ENTMCNC: 32.5 G/DL (ref 31–37)
MCV RBC AUTO: 80.6 FL
MCV RBC AUTO: 81.1 FL (ref 80–100)
MIXED CELL %: 16.1 %
MONOCYTES # BLD AUTO: 1.46 X10(3) UL (ref 0.1–1)
MONOCYTES NFR BLD AUTO: 14.4 %
NEUTROPHILS # BLD AUTO: 6.06 X10 (3) UL (ref 1.5–7.7)
NEUTROPHILS # BLD AUTO: 6.06 X10(3) UL (ref 1.5–7.7)
NEUTROPHILS # BLD AUTO: 6.2 X10ˆ3/UL (ref 1.5–7.7)
NEUTROPHILS NFR BLD AUTO: 59.9 %
NEUTROPHILS NFR BLD AUTO: 60.4 %
PLATELET # BLD AUTO: 181 10(3)UL (ref 150–450)
POCT BILIRUBIN URINE: NEGATIVE
POCT GLUCOSE URINE: NEGATIVE MG/DL
POCT KETONE URINE: NEGATIVE MG/DL
POCT LEUKOCYTE ESTERASE URINE: NEGATIVE
POCT NITRITE URINE: NEGATIVE
POCT PH URINE: 5.5 (ref 5–8)
POCT PROTEIN URINE: NEGATIVE MG/DL
POCT SPECIFIC GRAVITY URINE: 1
POCT URINE COLOR: YELLOW
POCT UROBILINOGEN URINE: 0.2 MG/DL
POTASSIUM BLD-SCNC: 3.5 MMOL/L (ref 3.6–5.1)
RBC # BLD AUTO: 4.74 X10(6)UL
RBC # BLD AUTO: 4.75 X10ˆ6/UL
SODIUM BLD-SCNC: 140 MMOL/L (ref 136–145)
WBC # BLD AUTO: 10.1 X10(3) UL (ref 4–11)
WBC # BLD AUTO: 10.3 X10ˆ3/UL (ref 4–11)

## 2023-07-02 PROCEDURE — 85025 COMPLETE CBC W/AUTO DIFF WBC: CPT | Performed by: EMERGENCY MEDICINE

## 2023-07-02 PROCEDURE — 80047 BASIC METABLC PNL IONIZED CA: CPT

## 2023-07-02 PROCEDURE — 99214 OFFICE O/P EST MOD 30 MIN: CPT

## 2023-07-02 PROCEDURE — 81025 URINE PREGNANCY TEST: CPT

## 2023-07-02 PROCEDURE — S0028 INJECTION, FAMOTIDINE, 20 MG: HCPCS

## 2023-07-02 PROCEDURE — 96361 HYDRATE IV INFUSION ADD-ON: CPT

## 2023-07-02 PROCEDURE — 96374 THER/PROPH/DIAG INJ IV PUSH: CPT

## 2023-07-02 PROCEDURE — 81002 URINALYSIS NONAUTO W/O SCOPE: CPT | Performed by: EMERGENCY MEDICINE

## 2023-07-02 RX ORDER — FAMOTIDINE 10 MG/ML
20 INJECTION, SOLUTION INTRAVENOUS ONCE
Status: COMPLETED | OUTPATIENT
Start: 2023-07-02 | End: 2023-07-02

## 2023-07-02 RX ORDER — MAGNESIUM HYDROXIDE/ALUMINUM HYDROXICE/SIMETHICONE 120; 1200; 1200 MG/30ML; MG/30ML; MG/30ML
30 SUSPENSION ORAL ONCE
Status: COMPLETED | OUTPATIENT
Start: 2023-07-02 | End: 2023-07-02

## 2023-07-02 RX ORDER — PANTOPRAZOLE SODIUM 40 MG/1
40 TABLET, DELAYED RELEASE ORAL DAILY
Qty: 14 TABLET | Refills: 0 | Status: SHIPPED | OUTPATIENT
Start: 2023-07-02 | End: 2023-07-02

## 2023-07-02 RX ORDER — SUCRALFATE 1 G/1
1 TABLET ORAL
Qty: 56 TABLET | Refills: 0 | Status: SHIPPED | OUTPATIENT
Start: 2023-07-02 | End: 2023-07-16

## 2023-07-02 RX ORDER — SUCRALFATE 1 G/1
1 TABLET ORAL
Qty: 56 TABLET | Refills: 0 | Status: SHIPPED | OUTPATIENT
Start: 2023-07-02 | End: 2023-07-02

## 2023-07-02 RX ORDER — SODIUM CHLORIDE 9 MG/ML
1000 INJECTION, SOLUTION INTRAVENOUS ONCE
Status: COMPLETED | OUTPATIENT
Start: 2023-07-02 | End: 2023-07-02

## 2023-07-02 RX ORDER — PANTOPRAZOLE SODIUM 40 MG/1
40 TABLET, DELAYED RELEASE ORAL DAILY
Qty: 14 TABLET | Refills: 0 | Status: SHIPPED | OUTPATIENT
Start: 2023-07-02 | End: 2023-07-16

## 2023-07-02 RX ORDER — LIDOCAINE HYDROCHLORIDE 20 MG/ML
10 SOLUTION OROPHARYNGEAL ONCE
Status: DISCONTINUED | OUTPATIENT
Start: 2023-07-02 | End: 2023-07-02

## 2023-07-02 NOTE — ED INITIAL ASSESSMENT (HPI)
Pt c/o midepigastric pain starting Friday am with several episodes of diarrhea and fever for 1 day, denies n/v, just home from the [place] [id number]

## 2023-07-02 NOTE — DISCHARGE INSTRUCTIONS
Continue Imodium for diarrhea. Acetaminophen for pain. Avoid aspirin, ibuprofen and Aleve for several days.

## 2023-07-10 DIAGNOSIS — E78.00 PURE HYPERCHOLESTEROLEMIA: ICD-10-CM

## 2023-07-11 RX ORDER — ATORVASTATIN CALCIUM 10 MG/1
10 TABLET, FILM COATED ORAL EVERY MORNING
Qty: 90 TABLET | Refills: 0 | Status: SHIPPED | OUTPATIENT
Start: 2023-07-11

## 2023-07-11 NOTE — TELEPHONE ENCOUNTER
Requesting:      atorvastatin 10 MG Oral Tab         Sig: Take 1 tablet (10 mg total) by mouth every morning. Disp: 90 tablet    Refills: 0    Start: 7/10/2023    Class: Normal    Non-formulary For: Pure hypercholesterolemia    Last ordered: 2 months ago by Choco Castellanos MD     Cholesterol Medication Protocol Ckhxzf10/10/2023 08:20 PM   Protocol Details ALT < 80    ALT resulted within past year    Lipid panel within past 12 months    Appointment within past 12 or next 3 months      To be filled at:  [id number] - [facility] [phone], [phone]             LOV:  3/27/23  RTC:  1 yr  Recent Labs:   7/2/23    Upcoming OV  none

## 2023-08-16 DIAGNOSIS — M54.16 LEFT LUMBAR RADICULITIS: ICD-10-CM

## 2023-08-17 RX ORDER — GABAPENTIN 100 MG/1
200 CAPSULE ORAL NIGHTLY
Qty: 180 CAPSULE | Refills: 0 | Status: SHIPPED | OUTPATIENT
Start: 2023-09-02

## 2023-08-17 NOTE — TELEPHONE ENCOUNTER
Refill Request    Medication request: gabapentin 100 MG Oral Cap. Take 2 capsules (200 mg total) by mouth nightly. LOV:1/11/2023 Tristian Valenzuela, DO   Due back to clinic per last office note: Per Dr. Alexandro Cardoza: \"Follow up after MRI is done. \"  NOV: Visit date not found      [place]: 06/04/2023 [place]180 [place]. Earliest fill date: 09/02/2023, order edited to reflect earliest fill date. Urine drug screen (if applicable): n/a  Pain contract: n/a    LOV plan (if weaning or changing medications): Per Dr. Alexandro Cardoza: Minnie Kwong will start gabapentin 200mg at bedtime x4 nights, then 200mg BID if no benefit and no side effects. \"

## 2023-09-08 PROBLEM — R10.13 ABDOMINAL PAIN, EPIGASTRIC: Status: ACTIVE | Noted: 2023-09-08

## 2023-09-29 NOTE — TELEPHONE ENCOUNTER
Requested Prescriptions     Pending Prescriptions Disp Refills    FAMOTIDINE 20 MG Oral Tab [Pharmacy Med Name: FAMOTIDINE 20 MG TABLET] 90 tablet 0     Sig: TAKE 1 TABLET BY MOUTH EVERY DAY *OTC NOT COVERED     PROTOCOL FAILED    LOV 3/27/23  RTC 1 year  Filled 6/22/23 90 tabs 0 refills  No FOV scheduled

## 2023-09-30 RX ORDER — FAMOTIDINE 20 MG/1
20 TABLET, FILM COATED ORAL DAILY
Qty: 90 TABLET | Refills: 0 | Status: SHIPPED | OUTPATIENT
Start: 2023-09-30

## 2023-10-30 ENCOUNTER — OFFICE VISIT (OUTPATIENT)
Dept: INTERNAL MEDICINE CLINIC | Facility: CLINIC | Age: 48
End: 2023-10-30

## 2023-10-30 VITALS
WEIGHT: 149.38 LBS | HEIGHT: 62 IN | BODY MASS INDEX: 27.49 KG/M2 | RESPIRATION RATE: 12 BRPM | SYSTOLIC BLOOD PRESSURE: 112 MMHG | OXYGEN SATURATION: 97 % | HEART RATE: 82 BPM | TEMPERATURE: 97 F | DIASTOLIC BLOOD PRESSURE: 68 MMHG

## 2023-10-30 DIAGNOSIS — R30.9 PAIN WITH URINATION: ICD-10-CM

## 2023-10-30 DIAGNOSIS — N39.0 ACUTE UTI: Primary | ICD-10-CM

## 2023-10-30 LAB
APPEARANCE: CLEAR
BILIRUBIN: NEGATIVE
GLUCOSE (URINE DIPSTICK): NEGATIVE MG/DL
KETONES (URINE DIPSTICK): NEGATIVE MG/DL
MULTISTIX LOT#: ABNORMAL NUMERIC
NITRITE, URINE: POSITIVE
PH, URINE: 6.5 (ref 4.5–8)
PROTEIN (URINE DIPSTICK): 30 MG/DL
SPECIFIC GRAVITY: 1.03 (ref 1–1.03)
URINE-COLOR: YELLOW
UROBILINOGEN,SEMI-QN: 0.2 MG/DL (ref 0–1.9)

## 2023-10-30 PROCEDURE — 87086 URINE CULTURE/COLONY COUNT: CPT | Performed by: FAMILY MEDICINE

## 2023-10-30 PROCEDURE — 87088 URINE BACTERIA CULTURE: CPT | Performed by: FAMILY MEDICINE

## 2023-10-30 PROCEDURE — 87186 SC STD MICRODIL/AGAR DIL: CPT | Performed by: FAMILY MEDICINE

## 2023-10-30 RX ORDER — NITROFURANTOIN 25; 75 MG/1; MG/1
100 CAPSULE ORAL 2 TIMES DAILY
Qty: 14 CAPSULE | Refills: 0 | Status: SHIPPED | OUTPATIENT
Start: 2023-10-30

## 2023-11-07 DIAGNOSIS — E78.00 PURE HYPERCHOLESTEROLEMIA: ICD-10-CM

## 2023-11-08 RX ORDER — ATORVASTATIN CALCIUM 10 MG/1
10 TABLET, FILM COATED ORAL EVERY MORNING
Qty: 90 TABLET | Refills: 1 | Status: SHIPPED | OUTPATIENT
Start: 2023-11-08

## 2023-12-12 ENCOUNTER — PATIENT MESSAGE (OUTPATIENT)
Dept: PHYSICAL MEDICINE AND REHAB | Facility: CLINIC | Age: 48
End: 2023-12-12

## 2023-12-16 DIAGNOSIS — M54.16 LEFT LUMBAR RADICULITIS: ICD-10-CM

## 2023-12-18 RX ORDER — GABAPENTIN 100 MG/1
200 CAPSULE ORAL NIGHTLY
Qty: 180 CAPSULE | Refills: 0 | Status: SHIPPED | OUTPATIENT
Start: 2023-12-18

## 2023-12-18 NOTE — TELEPHONE ENCOUNTER
Refill Request    Medication request: gabapentin 100 MG Oral Cap. Take 2 capsules (200 mg total) by mouth nightly. LOV:1/11/2023 Erica Laird DO   Due back to clinic per last office note:  \"Follow up after MRI is done. \"   NOV: 1/12/2024 Erica Laird DO      [place]: 08/17/2023 [place]180 - [place]    Urine drug screen (if applicable): n/a  Pain contract: n/a    LOV plan (if weaning or changing medications): Per Dr. Nasir Guerrero: Nettie Breaux will start gabapentin 200mg at bedtime x4 nights, then 200mg BID if no benefit and no side effects. \"     See PM 12/12/2023 for condition update with Gabapentin.

## 2024-01-02 RX ORDER — FAMOTIDINE 20 MG/1
20 TABLET, FILM COATED ORAL DAILY
Qty: 90 TABLET | Refills: 0 | OUTPATIENT
Start: 2024-01-02

## 2024-01-02 RX ORDER — FAMOTIDINE 20 MG/1
20 TABLET, FILM COATED ORAL DAILY
Qty: 90 TABLET | Refills: 0 | Status: SHIPPED | OUTPATIENT
Start: 2024-01-02

## 2024-01-02 NOTE — TELEPHONE ENCOUNTER
LOV: 3/27/23   RTC: 1 yr   Filled: 9/30/23 # 90 0 refills   Labs: 3/30/23   Future Appointments   Date Time Provider Department Center   1/12/2024 11:40 AM [id number]   3/28/2024 10:30 AM Manan Lynn MD [facility] [place]

## 2024-01-08 ENCOUNTER — HOSPITAL ENCOUNTER (OUTPATIENT)
Age: 49
Discharge: HOME | End: 2024-01-08
Payer: COMMERCIAL

## 2024-01-08 VITALS
OXYGEN SATURATION: 97 % | RESPIRATION RATE: 18 BRPM | BODY MASS INDEX: 27 KG/M2 | SYSTOLIC BLOOD PRESSURE: 129 MMHG | HEART RATE: 82 BPM | WEIGHT: 147 LBS | DIASTOLIC BLOOD PRESSURE: 78 MMHG | TEMPERATURE: 99 F

## 2024-01-08 DIAGNOSIS — J11.1 INFLUENZA: Primary | ICD-10-CM

## 2024-01-08 LAB
POCT INFLUENZA A: POSITIVE
POCT INFLUENZA B: NEGATIVE
SARS-COV-2 RNA RESP QL NAA+PROBE: NOT DETECTED

## 2024-01-08 PROCEDURE — 99214 OFFICE O/P EST MOD 30 MIN: CPT

## 2024-01-08 PROCEDURE — 87502 INFLUENZA DNA AMP PROBE: CPT | Performed by: NURSE PRACTITIONER

## 2024-01-08 PROCEDURE — 99213 OFFICE O/P EST LOW 20 MIN: CPT

## 2024-01-08 RX ORDER — PREDNISONE 20 MG/1
40 TABLET ORAL DAILY
Qty: 10 TABLET | Refills: 0 | Status: SHIPPED | OUTPATIENT
Start: 2024-01-08 | End: 2024-01-13

## 2024-01-08 RX ORDER — ALBUTEROL SULFATE 90 UG/1
2 AEROSOL, METERED RESPIRATORY (INHALATION) EVERY 4 HOURS PRN
Qty: 1 EACH | Refills: 0 | Status: SHIPPED | OUTPATIENT
Start: 2024-01-08 | End: 2024-02-07

## 2024-01-08 NOTE — ED PROVIDER NOTES
Patient Seen in: [facility]      History     Chief Complaint   Patient presents with    Fever     Stated Complaint: Cough, body aches    Subjective:   BONG Farnsworth is a 48-year-old female who presents today for evaluation of cough, body aches, headache and fever.  She works as a nurse at dialysis.  She states that her symptoms began 2 days ago.  She is here with her  who started with symptoms 1 day before she did.  She endorses productive cough that is sometimes causing wheezing and shortness of breath due to her asthma.  She endorses diffuse body aches, general malaise, fever and frontal headache.  She denies nausea, vomiting and diarrhea.    Objective:   Past Medical History:   Diagnosis Date    Abdominal pain     Acute, but ill-defined, cerebrovascular disease     Father    Asthma     Back pain     Belching     Bloating     Blurred vision     Chest pain     Deviated nasal septum     Esophageal reflux     Hay fever     Heartburn     High cholesterol     Hyperlipidemia     Indigestion     Irregular bowel habits     After gallbladder removed    Itch of skin     Lumbar disc disease     Pain in joints     Rash     Visual impairment     reading glasses    Wears glasses               Past Surgical History:   Procedure Laterality Date    CHOLECYSTECTOMY      COLONOSCOPY  2022    D & C      EGD      OTHER  03/12/2020    Removal of uterine polyps with Dr Sheehan    PAIN MANAGEMENT  06/25/2021    right SI Joint injections done under fluoroscopic guidance with contrast enhancement.    PARAGARD, IUD  03/01/2020                Social History     Socioeconomic History    Marital status:    Tobacco Use    Smoking status: Never    Smokeless tobacco: Never   Vaping Use    Vaping Use: Never used   Substance and Sexual Activity    Alcohol use: Yes     Alcohol/week: 1.0 standard drink of alcohol     Types: 1 Standard drinks or equivalent per week     Comment: Once a week    Drug use: No   Other  Topics Concern    Caffeine Concern Yes     Comment: 1 coffee daily    Exercise No    Seat Belt Yes   Social History Narrative    The patient does not use an assistive device..      The patient does live in a home with stairs.              Review of Systems    Positive for stated complaint: Cough, body aches  Other systems are as noted in HPI.  Constitutional and vital signs reviewed.      All other systems reviewed and negative except as noted above.    Physical Exam     ED Triage Vitals [01/08/24 0959]   /78   Pulse 82   Resp 18   Temp 98.5 °F (36.9 °C)   Temp src Temporal   SpO2 97 %   O2 Device None (Room air)       Current:/78   Pulse 82   Temp 98.5 °F (36.9 °C) (Temporal)   Resp 18   Wt 66.7 kg   LMP 01/06/2024   SpO2 97%   BMI 26.89 kg/m²         Physical Exam    Nursing note reviewed. Vital signs reviewed.  Constitutional: Oriented to person, place, and time. Appears well-developed and well-nourished. Ill appearing, but non-toxic.  No distress.   HENT:   Head: Normocephalic and atraumatic.   Right Ear: Hearing, tympanic membrane, external ear and ear canal normal.   Left Ear: Hearing, tympanic membrane, external ear and ear canal normal.   Nose: +Mucosal edema.  No sinus tenderness.   Mouth/Throat: Uvula is midline and mucous membranes are normal. Posterior pharynx with mild erythema and post nasal drip.    Neck: Normal range of motion. Neck supple.   Cardiovascular: Normal rate, regular rhythm, normal heart sounds and intact distal pulses.    Pulmonary/Chest: Effort normal and breath sounds normal. No respiratory distress. No wheezing, rales, ronchi.  Abdominal: Soft. There is no tenderness.   Neurological: alert and oriented to person, place, and time.   Skin: Skin is warm and dry. Not diaphoretic.   Nursing note and vitals reviewed.    ED Course     Labs Reviewed   POCT FLU TEST - Abnormal; Notable for the following components:       Result Value    POCT INFLUENZA A Positive (*)     All  other components within normal limits    Narrative:     This assay is a rapid molecular in vitro test utilizing nucleic acid amplification of influenza A and B viral RNA.   RAPID SARS-COV-2 BY PCR - Normal             Patient informed of her positive flu test.         [facility]          Medical Decision Making  Patient is a 48-year-old female who presents today for evaluation of cough, headache, fever and bodyaches.  Her  is also here with similar symptoms.  Differential diagnosis includes, but is not limited to COVID-19, influenza, pneumonia, URI, other potentially life-threatening processes.  Patient tested positive for influenza A today.  She is informed of this finding.  She will be discharged home with 5 days of prednisone and albuterol inhaler only if her asthma symptoms to flareup due to influenza.  Tamiflu risks versus benefits discussed and patient opted to not take Tamiflu.  Further supportive management discussed.  Return precautions given.  She expresses understanding and agrees with the plan.    Amount and/or Complexity of Data Reviewed  Labs: ordered. Decision-making details documented in ED Course.    Risk  OTC drugs.  Prescription drug management.        Disposition and Plan     Clinical Impression:  1. Influenza         Disposition:  Discharge  1/8/2024 10:42 am    Follow-up:  Manan Lynn MD  [address] [place] [id number] [phone]      As needed          Medications Prescribed:  Discharge Medication List as of 1/8/2024 10:52 AM        START taking these medications    Details   predniSONE 20 MG Oral Tab Take 2 tablets (40 mg total) by mouth daily for 5 days., Print, Disp-10 tablet, R-0      albuterol 108 (90 Base) MCG/ACT Inhalation Aero Soln Inhale 2 puffs into the lungs every 4 (four) hours as needed for Wheezing., Print, Disp-1 each, R-0

## 2024-01-08 NOTE — DISCHARGE INSTRUCTIONS
Please return to the Emergency department/clinic if symptoms worsen or you develop new symptoms.  Follow up with your primary care physician in 2 days. Take any medications prescribed to you as instructed.    You have been diagnosed with influenza, or a similar virus.   This illness is self-limited, meaning your immune system will eliminate the virus on it's own.    It is important to give your immune system the tools it needs to fight the illness - this includes drinking plenty of water and getting plenty of rest.     Medicine will help you feel better including ibuprofen and tylenol for fevers and body aches.      If you are given Tamiflu - this is an antiviral medication.  It will not make you feel better like antibiotics can.  It may shorten the duration of illness by about 18 - 36 hours.  It has adverse effects such as nausea, dizziness and abdominal pain.  It is best for people who are very young, elderly or otherwise immune comprimised.

## 2024-02-01 ENCOUNTER — OFFICE VISIT (OUTPATIENT)
Dept: PHYSICAL MEDICINE AND REHAB | Facility: CLINIC | Age: 49
End: 2024-02-01
Payer: COMMERCIAL

## 2024-02-01 VITALS — HEIGHT: 62 IN | BODY MASS INDEX: 27.05 KG/M2 | WEIGHT: 147 LBS

## 2024-02-01 DIAGNOSIS — M53.3 PAIN OF RIGHT SACROILIAC JOINT: Primary | ICD-10-CM

## 2024-02-01 PROCEDURE — 99214 OFFICE O/P EST MOD 30 MIN: CPT | Performed by: PHYSICAL MEDICINE & REHABILITATION

## 2024-02-01 PROCEDURE — 3008F BODY MASS INDEX DOCD: CPT | Performed by: PHYSICAL MEDICINE & REHABILITATION

## 2024-02-01 RX ORDER — METHYLPREDNISOLONE 4 MG/1
TABLET ORAL
Qty: 1 EACH | Refills: 0 | Status: SHIPPED | OUTPATIENT
Start: 2024-02-01

## 2024-02-01 NOTE — PATIENT INSTRUCTIONS
Consider a trial of glucosamine/chondroitin sulfate in either October or November. Alternatively you may trial it now.     Increase the gabapentin to 100mg in the morning, 200mg at night time. When the flare subsides you may resume 200mg at night time.     I will send a medrol dosepack to your pharmacy and you may consider if you want to take it vs increasing the gabapentin for 1-2 weeks.     If symptoms worsen please contact the clinic.

## 2024-02-02 NOTE — PROGRESS NOTES
Progress note    C/C:   Chief Complaint   Patient presents with    Follow - Up     LOV 01/11/23 pt is here with complaints pf recurring hip and back pain.. Imagine of lumbar spine was completed 1/17/23. Pain started last Monday and reports it to be both aching and stabbing. Takes no pain meds . No N/T.Pain 4/10      HPI: 48-year-old female presents for follow-up.  Was doing reasonably well until Monday when she began to have acute right lower back/buttock pain.  Since the last time I saw her she developed gastritis, and was told to avoid NSAIDs.  She continues to take gabapentin nightly which seemed to help prior to the acute episode of lower back pain.  Right buttock pain worsens the most with transitioning from sit to stand.    Pertinent allergies:   Allergies   Allergen Reactions    Tetracycline PAIN     abdominal    Bactrim [Sulfamethoxazole W/Trimethoprim] RASH        Physical exam:  Ht 62\"   Wt 147 lb (66.7 kg)   LMP 01/06/2024   BMI 26.89 kg/m²      Lumbar spine exam:    No skin rash lumbar/upper sacral region  No pain with lumbar flexion. + pain with lumbar extension.  No tenderness to palpation bilateral lumbar paraspinals. + ttp right PSIS.  5/5 LE strength b/l in HF, KE, ADF, EHL, ankle eversion  SILT b/l LE  2/4 quad, gastrocs reflexes b/l  SLR right negative  KATHERINE test + right SIJ pain  Gaenslens' test + right SIN pain    Imaging: No new imaging to review    Assessment and plan  Right sacroiliac joint pain; recurrent, acute right low back/buttock pain    Recommend medrol dosepack, increase gabapentin to 100mg qAM, 200mg at bedtime. If no improvement in the next 2 weeks consider right SIJ steroid injection under US guidance.     She will let me know if she is no better in 2 weeks.

## 2024-03-10 DIAGNOSIS — M54.16 LEFT LUMBAR RADICULITIS: ICD-10-CM

## 2024-03-11 RX ORDER — GABAPENTIN 100 MG/1
200 CAPSULE ORAL AS DIRECTED
Qty: 210 CAPSULE | Refills: 0 | Status: SHIPPED | OUTPATIENT
Start: 2024-03-11

## 2024-03-11 RX ORDER — FAMOTIDINE 20 MG/1
20 TABLET, FILM COATED ORAL DAILY
Qty: 90 TABLET | Refills: 0 | Status: SHIPPED | OUTPATIENT
Start: 2024-03-11

## 2024-03-11 NOTE — TELEPHONE ENCOUNTER
Refill Request    Medication request: gabapentin 100 MG Oral Cap. Take 2 capsules (200 mg total) by mouth nightly.     LAST GFR: 03/30/2023; 111. Protocol Passed.     LOV:2/1/2024 Dany Jimenez DO   Due back to clinic per last office note: Per Dr. Jimenez: \"If symptoms worsen please contact the clinic.\"  NOV: Visit date not found      [place]: 12/18/2023 #180 - 90 day supply with previous RX instructions. This order has been updated to reflect new instructions given at LOV note.    Urine drug screen (if applicable): n/a  Pain contract: n/a    LOV plan (if weaning or changing medications): Per Dr. Jimenez: \"Increase the gabapentin to 100mg in the morning, 200mg at night time. When the flare subsides you may resume 200mg at night time.\"

## 2024-03-11 NOTE — TELEPHONE ENCOUNTER
LOV: 3/27/23   RTC: 1 yr   Filled: 1/2/24 # 90   Labs: 3/20/23   Future Appointments   Date Time Provider Department Center   3/28/2024 11:00 AM Manan Lynn MD [facility] [place]

## 2024-03-20 ENCOUNTER — OFFICE VISIT (OUTPATIENT)
Dept: ORTHOPEDICS CLINIC | Facility: CLINIC | Age: 49
End: 2024-03-20
Payer: COMMERCIAL

## 2024-03-20 ENCOUNTER — HOSPITAL ENCOUNTER (OUTPATIENT)
Dept: GENERAL RADIOLOGY | Age: 49
Discharge: HOME | End: 2024-03-20
Attending: PHYSICIAN ASSISTANT
Payer: COMMERCIAL

## 2024-03-20 VITALS — WEIGHT: 147 LBS | BODY MASS INDEX: 27.05 KG/M2 | HEIGHT: 62 IN

## 2024-03-20 DIAGNOSIS — M65.4 TENOSYNOVITIS OF RADIAL STYLOID: Primary | ICD-10-CM

## 2024-03-20 DIAGNOSIS — M25.531 RIGHT WRIST PAIN: ICD-10-CM

## 2024-03-20 PROCEDURE — 73110 X-RAY EXAM OF WRIST: CPT | Performed by: PHYSICIAN ASSISTANT

## 2024-03-20 PROCEDURE — 99203 OFFICE O/P NEW LOW 30 MIN: CPT | Performed by: PHYSICIAN ASSISTANT

## 2024-03-20 PROCEDURE — 3008F BODY MASS INDEX DOCD: CPT | Performed by: PHYSICIAN ASSISTANT

## 2024-03-20 NOTE — H&P
Clinic Note [facility]     Assessment/Plan:  48 year old female    1.  Right de Quervain's tenosynovitis.  Symptoms are mild at this point we will continue with bracing and anti-inflammatories.  If her symptoms persist in the next 5 weeks we will try cortisone injection.  All of her questions were answered.       Follow Up: 6 weeks, can cancel if doing well      ICD-10-CM    1. Tenosynovitis of radial styloid  M65.4 DME - EXTERNAL           Diagnostic Studies:  No new x-rays taken today.    Physical Exam:    Ht 5' 2\" (1.575 m)   Wt 147 lb (66.7 kg)   LMP 01/06/2024   BMI 26.89 kg/m²     Constitutional: NAD. AOx3. Well-developed and Well-nourished.   Psychiatric: Normal mood/ affect/ behavior. Judgment and thought content normal.     Right upper Extremity:   Inspection: Skin Intact. No skin lesions. No gross deformity.   Palpation:  Tender to palpation over the first dorsal compartment.  Positive Finkelstein's maneuver   Motion: Elbow: normal bilateral symmetric ext/flex  Wrist: normal bilateral symmetric ext/flex/sup/pro  Finger: full composite fist       CC: Wrist Pain (RT WRIST PAIN; NO INJURY; ONSET: MONDAY )        HPI: This 48 year old female presents with right radial sided wrist pain.  Patient has pain with ulnar deviation of the wrist.  Patient notes tenderness to palpation over the radial side of the wrist. Pain is described as aching sharp shooting.  Her pain limits the ability to perform her activities of daily living.  No numbness or tingling in the fingers.  Patient has tried my, It has been going on for 2 days.        Past Medical History:   Diagnosis Date    Abdominal pain     Acute, but ill-defined, cerebrovascular disease     Father    Asthma (HCC)     Back pain     Belching     Bloating     Blurred vision     Chest pain     Deviated nasal septum     Esophageal reflux     Hay fever     Heartburn     High cholesterol     Hyperlipidemia     Indigestion     Irregular bowel habits     After  gallbladder removed    Itch of skin     Lumbar disc disease     Pain in joints     Rash     Visual impairment     reading glasses    Wears glasses      Past Surgical History:   Procedure Laterality Date    CHOLECYSTECTOMY      COLONOSCOPY      D & C      EGD      OTHER  2020    Removal of uterine polyps with Dr Sheehan    PAIN MANAGEMENT  2021    right SI Joint injections done under fluoroscopic guidance with contrast enhancement.    ASHLEY ESCALERAD  2020     Current Outpatient Medications   Medication Sig Dispense Refill    famotidine 20 MG Oral Tab Take 1 tablet (20 mg total) by mouth daily. 90 tablet 0    gabapentin 100 MG Oral Cap Take 2 capsules (200 mg total) by mouth As Directed. Take 1 capsule (100MG total) by mouth in the morning. Take 2 capsules (200MG total) by mouth at bedtime. 210 capsule 0    atorvastatin 10 MG Oral Tab TAKE 1 TABLET BY MOUTH IN THE MORNING 90 tablet 1    Cetirizine HCl (ZYRTEC ALLERGY) 10 MG Oral Cap Take 20 mg by mouth 2 (two) times a day. 30 capsule 0    Vitamin D3 2000 UNITS Oral Cap Take 1 capsule (2,000 Units total) by mouth daily.      methylPREDNISolone (MEDROL) 4 MG Oral Tablet Therapy Pack Take as directed 1 each 0    omega-3 fatty acids 1000 MG Oral Cap Take 1,000 mg by mouth daily.       Allergies   Allergen Reactions    Tetracycline PAIN     abdominal    Bactrim [Sulfamethoxazole W/Trimethoprim] RASH     Family History   Problem Relation Age of Onset    Hypertension Father     Stroke Father             Other (Other) Father         CVA    Alcohol and Other Disorders Associated Mother     Diabetes Mother     Hypertension Mother     Diabetes Maternal Grandfather      Social History     Occupational History    Not on file   Tobacco Use    Smoking status: Never    Smokeless tobacco: Never   Vaping Use    Vaping Use: Never used   Substance and Sexual Activity    Alcohol use: Yes     Alcohol/week: 1.0 standard drink of alcohol     Types: 1 Standard  drinks or equivalent per week     Comment: Once a week    Drug use: No    Sexual activity: Not on file          Review of Systems (negative unless bolded):  General: fevers, chills, fatigue  CV:  chest pain, palpitations, leg swelling  Msk: bodyaches, neck pain, neck stiffness  Skin: rashes, open wounds, nonhealing ulcers  Hem: bleeds easily, bruise easily, immunocompromised  Neuro: dizziness, light headedness, headaches  Psych: anxious, depressed, anger issues      Asia Pitt PA-C  Hand, Wrist, & Elbow Surgery  Physician Assistant to Dr. Rick Lindsay  [facility]  [address] [place] [address], [address], [email or web address]  [email or web address]  t: [phone]  f: [phone]

## 2024-03-28 ENCOUNTER — OFFICE VISIT (OUTPATIENT)
Dept: INTERNAL MEDICINE CLINIC | Facility: CLINIC | Age: 49
End: 2024-03-28
Payer: COMMERCIAL

## 2024-03-28 ENCOUNTER — LAB ENCOUNTER (OUTPATIENT)
Dept: LAB | Age: 49
End: 2024-03-28
Attending: INTERNAL MEDICINE
Payer: COMMERCIAL

## 2024-03-28 VITALS
DIASTOLIC BLOOD PRESSURE: 68 MMHG | WEIGHT: 146.19 LBS | OXYGEN SATURATION: 100 % | TEMPERATURE: 98 F | HEIGHT: 62 IN | RESPIRATION RATE: 14 BRPM | HEART RATE: 65 BPM | SYSTOLIC BLOOD PRESSURE: 122 MMHG | BODY MASS INDEX: 26.9 KG/M2

## 2024-03-28 DIAGNOSIS — Z00.00 ROUTINE GENERAL MEDICAL EXAMINATION AT A HEALTH CARE FACILITY: Primary | ICD-10-CM

## 2024-03-28 DIAGNOSIS — E78.00 PURE HYPERCHOLESTEROLEMIA: Chronic | ICD-10-CM

## 2024-03-28 PROBLEM — R10.13 ABDOMINAL PAIN, EPIGASTRIC: Status: RESOLVED | Noted: 2023-09-08 | Resolved: 2024-03-28

## 2024-03-28 LAB
ALBUMIN SERPL-MCNC: 4.9 G/DL (ref 3.2–4.8)
ALBUMIN/GLOB SERPL: 1.6 {RATIO} (ref 1–2)
ALP LIVER SERPL-CCNC: 60 U/L
ALT SERPL-CCNC: 18 U/L
ANION GAP SERPL CALC-SCNC: 8 MMOL/L (ref 0–18)
AST SERPL-CCNC: 22 U/L (ref ?–34)
ATRIAL RATE: 57 BPM
BASOPHILS # BLD AUTO: 0.09 X10(3) UL (ref 0–0.2)
BASOPHILS NFR BLD AUTO: 1.2 %
BILIRUB SERPL-MCNC: 0.7 MG/DL (ref 0.3–1.2)
BILIRUB UR QL: NEGATIVE
BUN BLD-MCNC: 10 MG/DL (ref 9–23)
BUN/CREAT SERPL: 14.9 (ref 10–20)
CALCIUM BLD-MCNC: 9.5 MG/DL (ref 8.7–10.4)
CHLORIDE SERPL-SCNC: 106 MMOL/L (ref 98–112)
CHOLEST SERPL-MCNC: 166 MG/DL (ref ?–200)
CLARITY UR: CLEAR
CO2 SERPL-SCNC: 25 MMOL/L (ref 21–32)
CREAT BLD-MCNC: 0.67 MG/DL
DEPRECATED RDW RBC AUTO: 41.1 FL (ref 35.1–46.3)
EGFRCR SERPLBLD CKD-EPI 2021: 107 ML/MIN/1.73M2 (ref 60–?)
EOSINOPHIL # BLD AUTO: 0.34 X10(3) UL (ref 0–0.7)
EOSINOPHIL NFR BLD AUTO: 4.5 %
ERYTHROCYTE [DISTWIDTH] IN BLOOD BY AUTOMATED COUNT: 14.1 % (ref 11–15)
EST. AVERAGE GLUCOSE BLD GHB EST-MCNC: 114 MG/DL (ref 68–126)
FASTING PATIENT LIPID ANSWER: YES
FASTING STATUS PATIENT QL REPORTED: YES
GLOBULIN PLAS-MCNC: 3.1 G/DL (ref 2.8–4.4)
GLUCOSE BLD-MCNC: 87 MG/DL (ref 70–99)
GLUCOSE UR-MCNC: NORMAL MG/DL
HBA1C MFR BLD: 5.6 % (ref ?–5.7)
HBV SURFACE AB SER QL: REACTIVE
HBV SURFACE AB SERPL IA-ACNC: >1000 MIU/ML
HCT VFR BLD AUTO: 44.3 %
HCV AB SERPL QL IA: NONREACTIVE
HDLC SERPL-MCNC: 72 MG/DL (ref 40–59)
HGB BLD-MCNC: 14 G/DL
HGB UR QL STRIP.AUTO: NEGATIVE
IMM GRANULOCYTES # BLD AUTO: 0.01 X10(3) UL (ref 0–1)
IMM GRANULOCYTES NFR BLD: 0.1 %
KETONES UR-MCNC: NEGATIVE MG/DL
LDLC SERPL CALC-MCNC: 82 MG/DL (ref ?–100)
LEUKOCYTE ESTERASE UR QL STRIP.AUTO: NEGATIVE
LYMPHOCYTES # BLD AUTO: 2.14 X10(3) UL (ref 1–4)
LYMPHOCYTES NFR BLD AUTO: 28.2 %
MCH RBC QN AUTO: 25.7 PG (ref 26–34)
MCHC RBC AUTO-ENTMCNC: 31.6 G/DL (ref 31–37)
MCV RBC AUTO: 81.4 FL
MONOCYTES # BLD AUTO: 0.67 X10(3) UL (ref 0.1–1)
MONOCYTES NFR BLD AUTO: 8.8 %
NEUTROPHILS # BLD AUTO: 4.35 X10 (3) UL (ref 1.5–7.7)
NEUTROPHILS # BLD AUTO: 4.35 X10(3) UL (ref 1.5–7.7)
NEUTROPHILS NFR BLD AUTO: 57.2 %
NITRITE UR QL STRIP.AUTO: NEGATIVE
NONHDLC SERPL-MCNC: 94 MG/DL (ref ?–130)
OSMOLALITY SERPL CALC.SUM OF ELEC: 286 MOSM/KG (ref 275–295)
P AXIS: 54 DEGREES
P-R INTERVAL: 154 MS
PH UR: 5 [PH] (ref 5–8)
PLATELET # BLD AUTO: 192 10(3)UL (ref 150–450)
PLATELETS.RETICULATED NFR BLD AUTO: 23.6 % (ref 0–7)
POTASSIUM SERPL-SCNC: 4.2 MMOL/L (ref 3.5–5.1)
PROT SERPL-MCNC: 8 G/DL (ref 5.7–8.2)
PROT UR-MCNC: NEGATIVE MG/DL
Q-T INTERVAL: 436 MS
QRS DURATION: 78 MS
QTC CALCULATION (BEZET): 424 MS
R AXIS: 83 DEGREES
RBC # BLD AUTO: 5.44 X10(6)UL
SODIUM SERPL-SCNC: 139 MMOL/L (ref 136–145)
SP GR UR STRIP: 1.02 (ref 1–1.03)
T AXIS: 44 DEGREES
TRIGL SERPL-MCNC: 59 MG/DL (ref 30–149)
TSI SER-ACNC: 1.28 MIU/ML (ref 0.55–4.78)
UROBILINOGEN UR STRIP-ACNC: NORMAL
VENTRICULAR RATE: 57 BPM
VIT D+METAB SERPL-MCNC: 30.9 NG/ML (ref 30–100)
VLDLC SERPL CALC-MCNC: 9 MG/DL (ref 0–30)
WBC # BLD AUTO: 7.6 X10(3) UL (ref 4–11)

## 2024-03-28 PROCEDURE — 3008F BODY MASS INDEX DOCD: CPT | Performed by: INTERNAL MEDICINE

## 2024-03-28 PROCEDURE — 3078F DIAST BP <80 MM HG: CPT | Performed by: INTERNAL MEDICINE

## 2024-03-28 PROCEDURE — 80050 GENERAL HEALTH PANEL: CPT | Performed by: INTERNAL MEDICINE

## 2024-03-28 PROCEDURE — 86706 HEP B SURFACE ANTIBODY: CPT | Performed by: INTERNAL MEDICINE

## 2024-03-28 PROCEDURE — 3074F SYST BP LT 130 MM HG: CPT | Performed by: INTERNAL MEDICINE

## 2024-03-28 PROCEDURE — 86803 HEPATITIS C AB TEST: CPT | Performed by: INTERNAL MEDICINE

## 2024-03-28 PROCEDURE — 81003 URINALYSIS AUTO W/O SCOPE: CPT | Performed by: INTERNAL MEDICINE

## 2024-03-28 PROCEDURE — 99396 PREV VISIT EST AGE 40-64: CPT | Performed by: INTERNAL MEDICINE

## 2024-03-28 PROCEDURE — 80061 LIPID PANEL: CPT | Performed by: INTERNAL MEDICINE

## 2024-03-28 PROCEDURE — 93000 ELECTROCARDIOGRAM COMPLETE: CPT | Performed by: INTERNAL MEDICINE

## 2024-03-28 PROCEDURE — 82306 VITAMIN D 25 HYDROXY: CPT | Performed by: INTERNAL MEDICINE

## 2024-03-28 PROCEDURE — 83036 HEMOGLOBIN GLYCOSYLATED A1C: CPT | Performed by: INTERNAL MEDICINE

## 2024-03-28 NOTE — PROGRESS NOTES
Daja Andino  10/5/1975 is a 48 year old female.    Chief Complaint   Patient presents with    Physical       HPI:   See below  Current Outpatient Medications   Medication Sig Dispense Refill    GLUCOSA-CHONDR-NA CHONDR-[facility]M OR       famotidine 20 MG Oral Tab Take 1 tablet (20 mg total) by mouth daily. 90 tablet 0    gabapentin 100 MG Oral Cap Take 2 capsules (200 mg total) by mouth As Directed. Take 1 capsule (100MG total) by mouth in the morning. Take 2 capsules (200MG total) by mouth at bedtime. 210 capsule 0    atorvastatin 10 MG Oral Tab TAKE 1 TABLET BY MOUTH IN THE MORNING 90 tablet 1    Cetirizine HCl (ZYRTEC ALLERGY) 10 MG Oral Cap Take 20 mg by mouth 2 (two) times a day. 30 capsule 0    Vitamin D3 2000 UNITS Oral Cap Take 1 capsule (2,000 Units total) by mouth daily.        Past Medical History:   Diagnosis Date    Abdominal pain     Acute, but ill-defined, cerebrovascular disease     Father    Allergic rhinitis 2000    Asthma ([facility])     Back pain     Belching     Bloating     Blurred vision     Chest pain     Deviated nasal septum     Esophageal reflux     Hay fever     Heartburn     High cholesterol     Hyperlipidemia     Indigestion     Irregular bowel habits     After gallbladder removed    Itch of skin     Lumbar disc disease     Pain in joints     Rash     Visual impairment     reading glasses    Wears glasses       Social History:  Social History     Socioeconomic History    Marital status:    Tobacco Use    Smoking status: Never    Smokeless tobacco: Never   Vaping Use    Vaping Use: Never used   Substance and Sexual Activity    Alcohol use: Yes     Alcohol/week: 1.0 standard drink of alcohol     Types: 1 Standard drinks or equivalent per week     Comment: Once a week    Drug use: No   Other Topics Concern    Caffeine Concern No    Exercise Yes    Seat Belt No    Special Diet No    Stress Concern No    Weight Concern No   Social History Narrative    The patient does not use an  assistive device..      The patient does live in a home with stairs.        REVIEW OF SYSTEMS:     General/Constitutional:   General able to do usual activities, good exercise tolerance, good general state of health, no fatigue, no fever, no weakness, no weight loss or gain .   HEENT/Neck:   Head no headache, no dizziness, no lightheadedness. Eyes normal vision, no redness, no drainage. Ears no earaches, no fullness, normal hearing, no tinnitus. Nose and Sinuses no recurrent colds, no discharge, no itching, no hay fever, no nosebleeds, no sinus trouble. Mouth and Pharynx , no hoarseness. Neck no lumps, no goiter, no neck stiffness or pain.   Endocrine:   Diabetes none. Thyroid disorder none.   Respiratory:   Patient denies chest pain, cough, RAJPUT (dyspnea on exertion), chest congestion, blood-tinged sputum/wheezing. Breathing normal pattern .   Cardiovascular:   Patient denies chest pain, shortness of breath/rheumatic fever/murmur/dizzziness cholesterol normal. Leg edema none. Orthopnea none. Palpitations none. PND (paroxsymal nocturnal dyspnea) none. exercise yes  Gastrointestinal:   Patient denies abdominal pain, blood in stool, constipation, diarrhea, difficulty swallowing, change in stools, heartburn, nausea, vomiting no weight changes noted no heart burn noted-   Hematology:   Patient denies abnormal bleeding, easy bleeding, easy bruising. Enlarged lymph nodes none.   Genitourinary:   Patient denies difficulty urinating, frequent urination, hematuria. Dysuria none. Nocturia None. no Urinary frequency. no Urinary incontinence. Se[facility] gyn at [facility] mammo-  Musculoskeletal:   Arthritis None. Back pain occ, chronic. Joint pain  occ knee pain   . Joint stiffness no. Muscle weakness none.   Peripheral Vascular:   General no varicosities, no claudication.   Dermatologic:   Rash none.   Neurologic:   Patient denies dizziness, fainting, loss of consciousness, weakness. Memory loss none. Tingling/numbness none. Trouble with  balance none.   Psychiatric:   Patient denies depression, hallucinations, memory loss. no Anxiety, none. Insomnia none.                EXAM:   /68 (BP Location: Left arm, Patient Position: Sitting, Cuff Size: adult)   Pulse 65   Temp 98.1 °F (36.7 °C) (Temporal)   Resp 14   Ht 5' 2\" (1.575 m)   Wt 146 lb 3.2 oz (66.3 kg)   LMP 03/05/2024 (Approximate)   SpO2 100%   BMI 26.74 kg/m²     GENERAL:   Build: normal .   General Appearance: alert and oriented, pleasant.   HEENT:   Ear canals: normal.   EOM: within normal limit-conjunctiva normal.     Head: normocephalic.   Nasal septum: deviated.   Nose: unremarkable.   Oral cavity: normal.  Pupils: normal, bilaterally .   Sclera: normal.   Turbinates: normal.   NECK:   Carotid bruit: none.   Cervical lymph nodes: unremarkable.   JVD: none.   Range of Motion: normal.   Thyroid: unremarkable.   HEART:   Clicks: absent .   Edema: none visible .   Heart sounds: normal.   Murmurs none   Rhythm: regular.   LUNGS:   Auscultation: clear .   Chest Shape: normal .   Percussion: normal.   Rales: no .   Respiratory effort: normal .   Rhonchi: no.   Wheezes: no.   ABDOMEN:   General: normal.   Hernia: absent.   Inguinal nodes: none.   Liver, Spleen: non-enlarged.   Rebound tenderness: absent.   Tenderness: absent .   EXTREMITIES:   Clubbing: none.   Cyanosis: absent .   Edema: none.   Pulses: present, bilateral.   Tremors: no.   Varicose veins: not present.   MUSCULOSKELETAL:   Cervical spines: normal.   L-S spines: normal.   Lower extremity joints: normal-except for moderate OA right knee upper extremity joints: normal.   NEUROLOGICAL:   Babinski: negative/all reflexes are normal.   Cerebellar Testing grossly/intact: yes.   Gait: normal.   Motor: power-normal/tone -normal/co-ordination normal/wasting -none/involuntary movements -none.   Sensory: normal sensation to all modalities.   LYMPHATICS:   Groin: no adenopathy .   Inguinal: no adenopathy.   Supraclavicular: none.    DERMATOLOGY:   Rash: no.                ASSESSMENT AND PLAN:   Daja was seen today for physical.    Diagnoses and all orders for this visit:    Routine general medical examination at a health care facility  -     Assay, Thyroid Stim Hormone  -     Hemoglobin A1C  -     CBC With Differential With Platelet  -     Urinalysis, Routine  -     Lipid Panel  -     Comp Metabolic Panel (14)  -     Hepatitis B Surface Antibody  -     HCV ANTIBODY [8472] [Q]  -     VITAMIN D, 25-HYDROXY [59043][Q]  -     EKG with interpretation and Report -IN OFFICE [09943]    Pure hypercholesterolemia  -     CARD TREADMILL STRESS, ADULT (CPT=93017); Future  -     EKG with interpretation and Report -IN OFFICE [85080]          Patient Instructions   Stress test test per patient request for coronary restratification.  Pending lab work   The patient indicates understanding of these issues and agrees to the plan.  The patient is asked to Return in about 1 year (around 3/28/2025).  .      Manan Lynn MD

## 2024-04-11 ENCOUNTER — HOSPITAL ENCOUNTER (OUTPATIENT)
Dept: CV DIAGNOSTICS | Facility: HOSPITAL | Age: 49
Discharge: HOME | End: 2024-04-11
Attending: INTERNAL MEDICINE
Payer: COMMERCIAL

## 2024-04-11 DIAGNOSIS — E78.00 PURE HYPERCHOLESTEROLEMIA: Chronic | ICD-10-CM

## 2024-04-11 PROCEDURE — 93017 CV STRESS TEST TRACING ONLY: CPT | Performed by: INTERNAL MEDICINE

## 2024-04-11 PROCEDURE — 93018 CV STRESS TEST I&R ONLY: CPT | Performed by: INTERNAL MEDICINE

## 2024-04-15 ENCOUNTER — PATIENT MESSAGE (OUTPATIENT)
Dept: INTERNAL MEDICINE CLINIC | Facility: CLINIC | Age: 49
End: 2024-04-15

## 2024-04-15 NOTE — TELEPHONE ENCOUNTER
BHAVIN - pt asking about his EKG results from 3/28/24. Please advise, there was never a result note. Ty!

## 2024-04-15 NOTE — TELEPHONE ENCOUNTER
From: Daja Andino  To: Manan Lynn  Sent: 4/15/2024 2:46 PM CDT  Subject: Ekg and stress test results     Good day!   Done with stress test and looks like it’s normal but how about my EKG results? Any advice about the results?

## 2024-05-07 DIAGNOSIS — E78.00 PURE HYPERCHOLESTEROLEMIA: ICD-10-CM

## 2024-05-09 RX ORDER — ATORVASTATIN CALCIUM 10 MG/1
10 TABLET, FILM COATED ORAL EVERY MORNING
Qty: 90 TABLET | Refills: 0 | Status: SHIPPED | OUTPATIENT
Start: 2024-05-09

## 2024-05-09 NOTE — TELEPHONE ENCOUNTER
Name from pharmacy: Atorvastatin Calcium Oral Tablet 10 MG         Will file in chart as: ATORVASTATIN 10 MG Oral Tab    Sig: TAKE 1 TABLET BY MOUTH IN THE MORNING    Disp: 90 tablet    Refills: 0    Start: 5/7/2024    Class: Normal    Non-formulary For: Pure hypercholesterolemia    Last ordered: 6 months ago (11/8/2023) by Manan Lynn MD    Last refill: 2/3/2024    Rx #: [id number]    Cholesterol Medication Protocol Oitdfb0505/07/2024 07:59 PM   Protocol Details ALT < 80    ALT resulted within past year    Lipid panel within past 12 months    In person appointment or virtual visit in the past 12 mos or appointment in next 3 mos      To be filled at: [id number] - [place], [place] - [address]  [phone], [phone]

## 2024-06-05 ENCOUNTER — OFFICE VISIT (OUTPATIENT)
Dept: PODIATRY CLINIC | Facility: CLINIC | Age: 49
End: 2024-06-05

## 2024-06-05 DIAGNOSIS — M21.611 BUNION, RIGHT FOOT: Primary | ICD-10-CM

## 2024-06-05 PROCEDURE — 99203 OFFICE O/P NEW LOW 30 MIN: CPT | Performed by: PODIATRIST

## 2024-06-05 NOTE — PROGRESS NOTES
[facility]  Progress Note    Daja Andino is a 48 year old female.   Chief Complaint   Patient presents with    Bunions     Consult right foot bunion intermittent pain 2-8/10. Onset a month ago.         HPI:     This is a pleasant female with PMH of lumbar radiculitis on gabapentin.      She presents to clinic today due to right foot bunion pain which is intermittent in nature.   She has not tried any treatments.        Allergies: Tetracycline and Bactrim [sulfamethoxazole w/trimethoprim]   Current Outpatient Medications   Medication Sig Dispense Refill    ATORVASTATIN 10 MG Oral Tab TAKE 1 TABLET BY MOUTH IN THE MORNING 90 tablet 0    GLUCOSA-CHONDR-NA CHONDR-MSM OR       famotidine 20 MG Oral Tab Take 1 tablet (20 mg total) by mouth daily. 90 tablet 0    gabapentin 100 MG Oral Cap Take 2 capsules (200 mg total) by mouth As Directed. Take 1 capsule (100MG total) by mouth in the morning. Take 2 capsules (200MG total) by mouth at bedtime. 210 capsule 0    Cetirizine HCl (ZYRTEC ALLERGY) 10 MG Oral Cap Take 20 mg by mouth 2 (two) times a day. 30 capsule 0    Vitamin D3 2000 UNITS Oral Cap Take 1 capsule (2,000 Units total) by mouth daily.        Past Medical History:    Abdominal pain    Acute, but ill-defined, cerebrovascular disease    Father    Allergic rhinitis    Asthma (HCC)    Back pain    Belching    Bloating    Blurred vision    Chest pain    Deviated nasal septum    Esophageal reflux    Hay fever    Heartburn    High cholesterol    Hyperlipidemia    Indigestion    Irregular bowel habits    After gallbladder removed    Itch of skin    Lumbar disc disease    Pain in joints    Rash    Visual impairment    reading glasses    Wears glasses      Past Surgical History:   Procedure Laterality Date    Cholecystectomy      Colonoscopy      D & c      Egd            Other  2020    Removal of uterine polyps with Dr Sheehan    Other surgical history       Uterine Polypectomy    Pain management  2021    right SI Joint injections done under fluoroscopic guidance with contrast enhancement.    Sud, iud  2020      Family History   Problem Relation Age of Onset    Hypertension Father     Stroke Father             Other (Other) Father         CVA    Alcohol and Other Disorders Associated Mother     Diabetes Mother     Hypertension Mother     Diabetes Maternal Grandfather       Social History     Socioeconomic History    Marital status:    Tobacco Use    Smoking status: Never    Smokeless tobacco: Never   Vaping Use    Vaping status: Never Used   Substance and Sexual Activity    Alcohol use: Yes     Alcohol/week: 1.0 standard drink of alcohol     Types: 1 Standard drinks or equivalent per week     Comment: Once a week    Drug use: No   Other Topics Concern    Caffeine Concern No    Exercise Yes    Seat Belt No    Special Diet No    Stress Concern No    Weight Concern No           REVIEW OF SYSTEMS:   Denies nausea, fever, chills  No calf pain  No other muscle or joint aches  Denies chest pain or shortness of breath.      EXAM:   LMP 2024 (Approximate)     Constitutional:   Patient in no apparent distress. Well kept Of normal body habitus. Alert and oriented to person, place, and time.  Integumentary examination:   There are no varicosities.   Skin appears moist, warm, and supple with positive hair growth.   There are no color changes. No open lesions. No macerations, No Hyperkeratotic lesions. Nails are of normal thickness and appearance.  Vascular examination:   DP pulse is 2/4  PT pulse is 2/4  Capillary refill is immediate  Edema is not present bilateral.  Temperature warm proximally to warm distally bilateral.  Neurological examination:   Vibratory (128-Hz tuning fork) sensation is present to right and is present to left.  Sharp/dull is present to right and is present to left.  Musculoskeletal examination:  Muscle Strength is  5/5.    Mild Right HAV deformity with mild POP along medial right 1st metatarsal head    Adequate right hallux DF at the MPJ without pain or crepitus      LABS & IMAGING:     Lab Results   Component Value Date    GLU 87 03/28/2024    BUN 10 03/28/2024    CREATSERUM 0.67 03/28/2024    BUNCREA 14.9 03/28/2024    ANIONGAP 8 03/28/2024    GFRAA 125 03/15/2022    GFRNAA 108 03/15/2022    CA 9.5 03/28/2024     03/28/2024    K 4.2 03/28/2024     03/28/2024    CO2 25.0 03/28/2024    OSMOCALC 286 03/28/2024        Lab Results   Component Value Date     03/28/2024    A1C 5.6 03/28/2024        No results found.     ASSESSMENT AND PLAN:   Diagnoses and all orders for this visit:    Bunion, right foot        Plan:     Discussed the etiology of this condition as well as both conservative and surgical treatment options.  Discussed conservative measures to include wide shoes, extra depth shoes, padding, offloading, orthotics, anti-inflammatory medication, and/or steroid injections.    Fitted and dispensed right foam bunion sleeve, pt to wear when ambulating in shoes and to remove at bedtime   Pt to cont wearing good supportive shoes    Ordered right foot full WB xrays, she will get if pain/bunion worsens  RTC PRN    No follow-ups on file.    Jose C Cooper DPM  6/5/2024

## 2024-06-06 RX ORDER — FAMOTIDINE 20 MG/1
20 TABLET, FILM COATED ORAL DAILY
Qty: 90 TABLET | Refills: 0 | Status: SHIPPED | OUTPATIENT
Start: 2024-06-06

## 2024-06-30 DIAGNOSIS — M54.16 LEFT LUMBAR RADICULITIS: ICD-10-CM

## 2024-07-01 RX ORDER — GABAPENTIN 100 MG/1
200 CAPSULE ORAL AS DIRECTED
Qty: 210 CAPSULE | Refills: 0 | Status: SHIPPED | OUTPATIENT
Start: 2024-07-01

## 2024-07-01 NOTE — TELEPHONE ENCOUNTER
Refill Request    Medication request: gabapentin 100 MG Oral Cap Take 2 capsules (200 mg total) by mouth As Directed. Take 1 capsule (100MG total) by mouth in the morning. Take 2 capsules (200MG total) by mouth at bedtime.     LOV:2/1/2024 Dany Jimenez,    Due back to clinic per last office note:  \"Return if symptoms worsen or fail to improve. \"  NOV: Visit date not found      [place] 3/11/2024 #210    Urine drug screen (if applicable): N/A  Pain contract: N/A    LOV plan (if weaning or changing medications): Per Dr. Jimenez's LOV notes: \"Recommend medrol dosepack, increase gabapentin to 100mg qAM, 200mg at bedtime. \"           NOTES:     Last CMP 3/28/2024- GFR = 107    Passed protocol-ok to fill.

## 2024-07-30 DIAGNOSIS — E78.00 PURE HYPERCHOLESTEROLEMIA: ICD-10-CM

## 2024-07-31 RX ORDER — ATORVASTATIN CALCIUM 10 MG/1
10 TABLET, FILM COATED ORAL EVERY MORNING
Qty: 90 TABLET | Refills: 2 | Status: SHIPPED | OUTPATIENT
Start: 2024-07-31

## 2024-07-31 NOTE — TELEPHONE ENCOUNTER
Atorvastatin 10mg     LOV: 3/28/24   RTC: 1 yr   Filled: 5/9/24 #90 0 refill   Labs: 3/28/24   No future appointments.

## 2024-09-16 RX ORDER — FAMOTIDINE 20 MG/1
20 TABLET, FILM COATED ORAL DAILY
Qty: 90 TABLET | Refills: 1 | Status: SHIPPED | OUTPATIENT
Start: 2024-09-16

## 2024-09-16 NOTE — TELEPHONE ENCOUNTER
Famotidine 20mg     LOV: 3/28/24   RTC 1 yr   Filled: 6/6/24 #90   Labs: 3/28/24   No future appointments.

## 2024-10-27 DIAGNOSIS — M54.16 LEFT LUMBAR RADICULITIS: ICD-10-CM

## 2024-10-28 RX ORDER — GABAPENTIN 100 MG/1
200 CAPSULE ORAL AS DIRECTED
Qty: 210 CAPSULE | Refills: 0 | Status: SHIPPED | OUTPATIENT
Start: 2024-10-28

## 2024-10-28 NOTE — TELEPHONE ENCOUNTER
Refill Request    Medication request: gabapentin 100 MG Oral Cap.  Take 2 capsules (200 mg total) by mouth As Directed. Take 1 capsule (100MG total) by mouth in the morning. Take 2 capsules (200MG total) by mouth at bedtime.      LOV:2/1/2024 Dany Jimenez DO   Due back to clinic per last office note:  Return if symptoms worsen or fail to improve   NOV: Visit date not found      [place] 7/1/24 #210 (60 days)    Urine drug screen (if applicable): N/A  Pain contract: N/A    LOV plan (if weaning or changing medications): Increase the gabapentin to 100mg in the morning, 200mg at night time. When the flare subsides you may resume 200mg at night time.

## 2024-12-19 ENCOUNTER — HOSPITAL ENCOUNTER (OUTPATIENT)
Dept: GENERAL RADIOLOGY | Age: 49
Discharge: HOME | End: 2024-12-19
Attending: PODIATRIST
Payer: COMMERCIAL

## 2024-12-19 DIAGNOSIS — M21.611 BUNION, RIGHT FOOT: ICD-10-CM

## 2024-12-19 PROCEDURE — 73630 X-RAY EXAM OF FOOT: CPT | Performed by: PODIATRIST

## 2025-02-13 ENCOUNTER — OFFICE VISIT (OUTPATIENT)
Dept: INTERNAL MEDICINE CLINIC | Facility: CLINIC | Age: 50
End: 2025-02-13
Payer: COMMERCIAL

## 2025-02-13 VITALS
SYSTOLIC BLOOD PRESSURE: 124 MMHG | WEIGHT: 152 LBS | DIASTOLIC BLOOD PRESSURE: 74 MMHG | OXYGEN SATURATION: 98 % | HEART RATE: 76 BPM | RESPIRATION RATE: 16 BRPM | TEMPERATURE: 97 F | BODY MASS INDEX: 27.97 KG/M2 | HEIGHT: 62 IN

## 2025-02-13 DIAGNOSIS — Z12.31 BREAST CANCER SCREENING BY MAMMOGRAM: ICD-10-CM

## 2025-02-13 DIAGNOSIS — Z00.00 BLOOD TESTS FOR ROUTINE GENERAL PHYSICAL EXAMINATION: ICD-10-CM

## 2025-02-13 DIAGNOSIS — R59.1 LYMPHADENOPATHY: Primary | ICD-10-CM

## 2025-02-13 PROCEDURE — 3074F SYST BP LT 130 MM HG: CPT | Performed by: INTERNAL MEDICINE

## 2025-02-13 PROCEDURE — 3008F BODY MASS INDEX DOCD: CPT | Performed by: INTERNAL MEDICINE

## 2025-02-13 PROCEDURE — 3078F DIAST BP <80 MM HG: CPT | Performed by: INTERNAL MEDICINE

## 2025-02-13 PROCEDURE — 99213 OFFICE O/P EST LOW 20 MIN: CPT | Performed by: INTERNAL MEDICINE

## 2025-02-13 RX ORDER — CEPHALEXIN 500 MG/1
500 CAPSULE ORAL 3 TIMES DAILY
Qty: 30 CAPSULE | Refills: 0 | Status: SHIPPED | OUTPATIENT
Start: 2025-02-13 | End: 2025-02-23

## 2025-02-13 NOTE — PROGRESS NOTES
Daja Andino  10/5/1975 is a 49 year old female.    Chief Complaint   Patient presents with    Bump     2 painful palpable nodule on back of neck       HPI:   Patient noted some small lumps in the base of the neck was wondering what it was denies any other complaints no scalp symptomatology no recent coloring her dyeing etc. no other complaints  Current Outpatient Medications   Medication Sig Dispense Refill    cephALEXin (KEFLEX) 500 MG Oral Cap Take 1 capsule (500 mg total) by mouth 3 (three) times daily for 10 days. 30 capsule 0    gabapentin 100 MG Oral Cap Take 2 capsules (200 mg total) by mouth As Directed. Take 1 capsule (100MG total) by mouth in the morning. Take 2 capsules (200MG total) by mouth at bedtime. 210 capsule 0    famotidine 20 MG Oral Tab TAKE 1 TABLET BY MOUTH EVERY DAY 90 tablet 1    atorvastatin 10 MG Oral Tab Take 1 tablet (10 mg total) by mouth every morning. 90 tablet 2    GLUCOSA-CHONDR-NA CHONDR-MSM OR       Cetirizine HCl (ZYRTEC ALLERGY) 10 MG Oral Cap Take 20 mg by mouth 2 (two) times a day. 30 capsule 0    Vitamin D3 2000 UNITS Oral Cap Take 1 capsule (2,000 Units total) by mouth daily.        Past Medical History:    Abdominal pain    Acute, but ill-defined, cerebrovascular disease    Father    Allergic rhinitis    Asthma (HCC)    Back pain    Belching    Bloating    Blurred vision    Chest pain    Deviated nasal septum    Esophageal reflux    Hay fever    Heartburn    High cholesterol    Hyperlipidemia    Indigestion    Irregular bowel habits    After gallbladder removed    Itch of skin    Lumbar disc disease    Pain in joints    Rash    Visual impairment    reading glasses    Wears glasses      Social History:  Social History     Socioeconomic History    Marital status:    Tobacco Use    Smoking status: Never    Smokeless tobacco: Never   Vaping Use    Vaping status: Never Used   Substance and Sexual Activity    Alcohol use: Yes     Alcohol/week: 1.0 standard  drink of alcohol     Types: 1 Standard drinks or equivalent per week     Comment: Once a week    Drug use: No   Other Topics Concern    Caffeine Concern No    Exercise Yes    Seat Belt No    Special Diet No    Stress Concern No    Weight Concern No   Social History Narrative    The patient does not use an assistive device..      The patient does live in a home with stairs.        REVIEW OF SYSTEMS:   Significant none        EXAM:   /74 (BP Location: Left arm, Patient Position: Sitting, Cuff Size: adult)   Pulse 76   Temp 97 °F (36.1 °C) (Temporal)   Resp 16   Ht 5' 2\" (1.575 m)   Wt 152 lb (68.9 kg)   LMP 01/20/2025 (Approximate)   SpO2 98%   BMI 27.80 kg/m²     Patient does have possible 2 small isolated lymph nodes 1 just posterior to the right occiput and the other 1 midline nuchal at the base of the skull each about half centimeter rest of the head eye ear nose throat examination is normal.  Scalp looks unremarkable      ASSESSMENT AND PLAN:   Daja was seen today for bump.    Diagnoses and all orders for this visit:    Lymphadenopathy  -     cephALEXin (KEFLEX) 500 MG Oral Cap; Take 1 capsule (500 mg total) by mouth 3 (three) times daily for 10 days.    Breast cancer screening by mammogram  -     [facility] MOUNIKA 2D+3D SCREENING BILAT (CPT=77067/06569); Future    Blood tests for routine general physical examination  -     [facility]IAN MOUNIKA 2D+3D SCREENING BILAT (CPT=77067/69386); Future  -     Assay, Thyroid Stim Hormone  -     Hemoglobin A1C  -     Lipid Panel  -     Comp Metabolic Panel (14)  -     CBC With Differential With Platelet  -     Urinalysis, Routine         Patient Instructions   Empiric antibiotics.  Local heat.  Reevaluate on March 2 for complete physical as well as lymph node reeval   The patient indicates understanding of these issues and agrees to the plan.  The patient is asked to Return in about 3 weeks (around 3/6/2025), or cpx.  .      Manan Lynn MD

## 2025-02-13 NOTE — PATIENT INSTRUCTIONS
Empiric antibiotics.  Local heat.  Reevaluate on March 2 for complete physical as well as lymph node reeval

## 2025-02-18 DIAGNOSIS — M54.16 LEFT LUMBAR RADICULITIS: ICD-10-CM

## 2025-02-19 RX ORDER — GABAPENTIN 100 MG/1
200 CAPSULE ORAL AS DIRECTED
Qty: 270 CAPSULE | Refills: 0 | Status: SHIPPED | OUTPATIENT
Start: 2025-02-19

## 2025-02-19 NOTE — TELEPHONE ENCOUNTER
Refill Request    Medication request:   Requested Prescriptions     Pending Prescriptions Disp Refills    gabapentin 100 MG Oral Cap 210 capsule 0     Sig: Take 2 capsules (200 mg total) by mouth As Directed. Take 1 capsule (100MG total) by mouth in the morning. Take 2 capsules (200MG total) by mouth at bedtime.         LOV:2/1/2024 Dany Jimenez,    Due back to clinic per last office note:  \"Return if symptoms worse or fail to improve\"  NOV: Visit date not found      [place] 10/28/24 #210 (84 days supply)    Urine drug screen (if applicable): n/a  Pain contract: n/a  LAST GFR: 3/28/24; 107. PROTOCOL failed: last visit over a year ago - routed to Dr. Jimenez    LOV plan (if weaning or changing medications): Per Dr. Jimenez, \"Increase the gabapentin to 100mg in the morning, 200mg at night time. When the flare subsides you may resume 200mg at night time.\"    MCM sent to pt informing of need for follow up appt.

## 2025-02-26 LAB
ABSOLUTE BASOPHILS: 98 CELLS/UL (ref 0–200)
ABSOLUTE EOSINOPHILS: 378 CELLS/UL (ref 15–500)
ABSOLUTE LYMPHOCYTES: 2345 CELLS/UL (ref 850–3900)
ABSOLUTE MONOCYTES: 539 CELLS/UL (ref 200–950)
ABSOLUTE NEUTROPHILS: 3640 CELLS/UL (ref 1500–7800)
ALBUMIN/GLOBULIN RATIO: 1.6 (CALC) (ref 1–2.5)
ALBUMIN: 4.4 G/DL (ref 3.6–5.1)
ALKALINE PHOSPHATASE: 54 U/L (ref 37–153)
ALT: 19 U/L (ref 6–29)
APPEARANCE: CLEAR
AST: 17 U/L (ref 10–35)
BASOPHILS: 1.4 %
BILIRUBIN, TOTAL: 0.5 MG/DL (ref 0.2–1.2)
BILIRUBIN: NEGATIVE
BUN: 13 MG/DL (ref 7–25)
CALCIUM: 9.1 MG/DL (ref 8.6–10.4)
CARBON DIOXIDE: 25 MMOL/L (ref 20–32)
CHLORIDE: 106 MMOL/L (ref 98–110)
CHOL/HDLC RATIO: 2.5 (CALC)
CHOLESTEROL, TOTAL: 178 MG/DL
COLOR: YELLOW
CREATININE: 0.61 MG/DL (ref 0.5–1.03)
EGFR: 109 ML/MIN/1.73M2
EOSINOPHILS: 5.4 %
GLOBULIN: 2.7 G/DL (CALC) (ref 1.9–3.7)
GLUCOSE: 93 MG/DL (ref 65–99)
GLUCOSE: NEGATIVE
HDL CHOLESTEROL: 70 MG/DL
HEMATOCRIT: 44 % (ref 35–45)
HEMOGLOBIN A1C: 5.7 % OF TOTAL HGB
HEMOGLOBIN: 14.1 G/DL (ref 11.7–15.5)
KETONES: NEGATIVE
LDL-CHOLESTEROL: 93 MG/DL (CALC)
LEUKOCYTE ESTERASE: NEGATIVE
LYMPHOCYTES: 33.5 %
MCH: 26.5 PG (ref 27–33)
MCHC: 32 G/DL (ref 32–36)
MCV: 82.6 FL (ref 80–100)
MONOCYTES: 7.7 %
MPV: 13.5 FL (ref 7.5–12.5)
NEUTROPHILS: 52 %
NITRITE: NEGATIVE
NON-HDL CHOLESTEROL: 108 MG/DL (CALC)
PH: 6 (ref 5–8)
PLATELET COUNT: 182 THOUSAND/UL (ref 140–400)
POTASSIUM: 4.2 MMOL/L (ref 3.5–5.3)
PROTEIN, TOTAL: 7.1 G/DL (ref 6.1–8.1)
PROTEIN: NEGATIVE
RDW: 13.4 % (ref 11–15)
RED BLOOD CELL COUNT: 5.33 MILLION/UL (ref 3.8–5.1)
SODIUM: 138 MMOL/L (ref 135–146)
SPECIFIC GRAVITY: 1.01 (ref 1–1.03)
TRIGLYCERIDES: 67 MG/DL
TSH: 2.02 MIU/L
WHITE BLOOD CELL COUNT: 7 THOUSAND/UL (ref 3.8–10.8)

## 2025-03-04 ENCOUNTER — OFFICE VISIT (OUTPATIENT)
Dept: INTERNAL MEDICINE CLINIC | Facility: CLINIC | Age: 50
End: 2025-03-04
Payer: COMMERCIAL

## 2025-03-04 VITALS
WEIGHT: 152 LBS | RESPIRATION RATE: 16 BRPM | OXYGEN SATURATION: 98 % | SYSTOLIC BLOOD PRESSURE: 118 MMHG | HEART RATE: 68 BPM | TEMPERATURE: 97 F | HEIGHT: 62 IN | DIASTOLIC BLOOD PRESSURE: 68 MMHG | BODY MASS INDEX: 27.97 KG/M2

## 2025-03-04 DIAGNOSIS — Z00.00 ROUTINE GENERAL MEDICAL EXAMINATION AT A HEALTH CARE FACILITY: Primary | ICD-10-CM

## 2025-03-04 DIAGNOSIS — E78.00 PURE HYPERCHOLESTEROLEMIA: Chronic | ICD-10-CM

## 2025-03-04 PROCEDURE — 3008F BODY MASS INDEX DOCD: CPT | Performed by: INTERNAL MEDICINE

## 2025-03-04 PROCEDURE — 3078F DIAST BP <80 MM HG: CPT | Performed by: INTERNAL MEDICINE

## 2025-03-04 PROCEDURE — 3074F SYST BP LT 130 MM HG: CPT | Performed by: INTERNAL MEDICINE

## 2025-03-04 PROCEDURE — 99396 PREV VISIT EST AGE 40-64: CPT | Performed by: INTERNAL MEDICINE

## 2025-03-04 RX ORDER — ATORVASTATIN CALCIUM 10 MG/1
10 TABLET, FILM COATED ORAL EVERY MORNING
Qty: 90 TABLET | Refills: 3 | Status: SHIPPED | OUTPATIENT
Start: 2025-03-04

## 2025-03-04 NOTE — PROGRESS NOTES
Daja Andino  10/5/1975 is a 49 year old female.    Chief Complaint   Patient presents with    Physical       HPI:   See below  Current Outpatient Medications   Medication Sig Dispense Refill    atorvastatin 10 MG Oral Tab Take 1 tablet (10 mg total) by mouth every morning. 90 tablet 3    gabapentin 100 MG Oral Cap Take 2 capsules (200 mg total) by mouth As Directed. Take 1 capsule (100MG total) by mouth in the morning. Take 2 capsules (200MG total) by mouth at bedtime. 270 capsule 0    famotidine 20 MG Oral Tab TAKE 1 TABLET BY MOUTH EVERY DAY 90 tablet 1    GLUCOSA-CHONDR-NA CHONDR-MSM OR       Cetirizine HCl (ZYRTEC ALLERGY) 10 MG Oral Cap Take 20 mg by mouth 2 (two) times a day. 30 capsule 0    Vitamin D3 2000 UNITS Oral Cap Take 1 capsule (2,000 Units total) by mouth daily.        Past Medical History:    Abdominal pain    Acute, but ill-defined, cerebrovascular disease    Father    Allergic rhinitis    Asthma (HCC)    Back pain    Belching    Bloating    Blurred vision    Chest pain    Deviated nasal septum    Esophageal reflux    Hay fever    Heartburn    High cholesterol    Hyperlipidemia    Indigestion    Irregular bowel habits    After gallbladder removed    Itch of skin    Lumbar disc disease    Pain in joints    Rash    Visual impairment    reading glasses    Wears glasses      Social History:  Social History     Socioeconomic History    Marital status:    Tobacco Use    Smoking status: Never    Smokeless tobacco: Never   Vaping Use    Vaping status: Never Used   Substance and Sexual Activity    Alcohol use: Yes     Alcohol/week: 1.0 standard drink of alcohol     Types: 1 Standard drinks or equivalent per week     Comment: Once a week    Drug use: No   Other Topics Concern    Caffeine Concern No    Exercise Yes    Seat Belt No    Special Diet No    Stress Concern No    Weight Concern No   Social History Narrative    The patient does not use an assistive device..      The patient does  live in a home with stairs.        REVIEW OF SYSTEMS:     General/Constitutional:   General able to do usual activities, good exercise tolerance, good general state of health, no fatigue, no fever, no weakness, no weight loss or gain .   HEENT/Neck:   Head no headache, no dizziness, no lightheadedness. Eyes normal vision, no redness, no drainage. Ears no earaches, no fullness, normal hearing, no tinnitus. Nose and Sinuses no recurrent colds, no discharge, no itching, no hay fever, no nosebleeds, no sinus trouble. Mouth and Pharynx , no hoarseness. Neck no lumps, no goiter, no neck stiffness or pain.   Endocrine:   Diabetes none. Thyroid disorder none.   Respiratory:   Patient denies chest pain, cough, RAJPUT (dyspnea on exertion), chest congestion, blood-tinged sputum/wheezing. Breathing normal pattern .   Cardiovascular:   Patient denies chest pain, shortness of breath/rheumatic fever/murmur/dizzziness cholesterol normal. Leg edema none. Orthopnea none. Palpitations none. PND (paroxsymal nocturnal dyspnea) none. exercise yes  Gastrointestinal:   Patient denies abdominal pain, blood in stool, constipation, diarrhea, difficulty swallowing, change in stools, heartburn, nausea, vomiting no weight changes noted no heart burn noted-   Hematology:   Patient denies abnormal bleeding, easy bleeding, easy bruising. Enlarged lymph nodes none.   Genitourinary:   Patient denies difficulty urinating, frequent urination, hematuria. Dysuria none. Nocturia None. no Urinary frequency. no Urinary incontinence. Sees gyn at [facility] mammo-  Musculoskeletal:   Arthritis None. Back pain occ, chronic. Joint pain  occ knee pain   . Joint stiffness no. Muscle weakness none.   Peripheral Vascular:   General no varicosities, no claudication.   Dermatologic:   Rash none.   Neurologic:   Patient denies dizziness, fainting, loss of consciousness, weakness. Memory loss none. Tingling/numbness none. Trouble with balance none.   Psychiatric:   Patient  denies depression, hallucinations, memory loss. no Anxiety, none. Insomnia none.                EXAM:   /68 (BP Location: Left arm, Patient Position: Sitting, Cuff Size: adult)   Pulse 68   Temp 97.1 °F (36.2 °C) (Temporal)   Resp 16   Ht 5' 2\" (1.575 m)   Wt 152 lb (68.9 kg)   LMP 01/20/2025 (Approximate)   SpO2 98%   BMI 27.80 kg/m²     GENERAL:   Build: normal .   General Appearance: alert and oriented, pleasant.   HEENT:   Ear canals: normal.   EOM: within normal limit-conjunctiva normal.     Head: normocephalic.   Nasal septum: deviated.   Nose: unremarkable.   Oral cavity: normal.  Pupils: normal, bilaterally .   Sclera: normal.   Turbinates: normal.   NECK:   Carotid bruit: none.   Cervical lymph nodes: unremarkable.   JVD: none.   Range of Motion: normal.   Thyroid: unremarkable.   HEART:   Clicks: absent .   Edema: none visible .   Heart sounds: normal.   Murmurs none   Rhythm: regular.   LUNGS:   Auscultation: clear .   Chest Shape: normal .   Percussion: normal.   Rales: no .   Respiratory effort: normal .   Rhonchi: no.   Wheezes: no.   ABDOMEN:   General: normal.   Hernia: absent.   Inguinal nodes: none.   Liver, Spleen: non-enlarged.   Rebound tenderness: absent.   Tenderness: absent .   EXTREMITIES:   Clubbing: none.   Cyanosis: absent .   Edema: none.   Pulses: present, bilateral.   Tremors: no.   Varicose veins: not present.   MUSCULOSKELETAL:   Cervical spines: normal.   L-S spines: normal.   Lower extremity joints: normal-except for moderate OA both knee upper extremity joints: normal.   NEUROLOGICAL:   Babinski: negative/all reflexes are normal.   Cerebellar Testing grossly/intact: yes.   Gait: normal.   Motor: power-normal/tone -normal/co-ordination normal/wasting -none/involuntary movements -none.   Sensory: normal sensation to all modalities.   LYMPHATICS:   Groin: no adenopathy .   Inguinal: no adenopathy.   Supraclavicular: none.   DERMATOLOGY:   Rash: no.                 ASSESSMENT AND PLAN:   Daja was seen today for physical.    Diagnoses and all orders for this visit:    Routine general medical examination at a health care facility    Pure hypercholesterolemia  -     atorvastatin 10 MG Oral Tab; Take 1 tablet (10 mg total) by mouth every morning.        Labs discussed with patient    Patient Instructions   Continue present management   The patient indicates understanding of these issues and agrees to the plan.  The patient is asked to Return in about 1 year (around 3/4/2026).  .      Manan Lynn MD

## 2025-03-12 NOTE — TELEPHONE ENCOUNTER
Protocol passed     LOV: 3/4/25   RTC: 1 yr   Filled: 9/16/24 #90 1 refill   Labs: 2/25/25   No future appointments.

## 2025-03-13 RX ORDER — FAMOTIDINE 20 MG/1
20 TABLET, FILM COATED ORAL DAILY
Qty: 90 TABLET | Refills: 1 | Status: SHIPPED | OUTPATIENT
Start: 2025-03-13

## 2025-03-26 ENCOUNTER — OFFICE VISIT (OUTPATIENT)
Dept: FAMILY MEDICINE CLINIC | Facility: CLINIC | Age: 50
End: 2025-03-26
Payer: COMMERCIAL

## 2025-03-26 VITALS
TEMPERATURE: 98 F | SYSTOLIC BLOOD PRESSURE: 132 MMHG | RESPIRATION RATE: 16 BRPM | BODY MASS INDEX: 28 KG/M2 | WEIGHT: 152 LBS | DIASTOLIC BLOOD PRESSURE: 68 MMHG | OXYGEN SATURATION: 98 % | HEART RATE: 57 BPM

## 2025-03-26 DIAGNOSIS — J02.9 SORE THROAT: Primary | ICD-10-CM

## 2025-03-26 DIAGNOSIS — R52 BODY ACHES: ICD-10-CM

## 2025-03-26 DIAGNOSIS — R68.83 CHILLS (WITHOUT FEVER): ICD-10-CM

## 2025-03-26 PROBLEM — N84.0 ENDOMETRIAL POLYP: Status: ACTIVE | Noted: 2020-03-12

## 2025-03-26 LAB
CONTROL LINE PRESENT WITH A CLEAR BACKGROUND (YES/NO): YES YES/NO
KIT LOT #: NORMAL NUMERIC
STREP GRP A CUL-SCR: NEGATIVE

## 2025-03-26 PROCEDURE — 87637 SARSCOV2&INF A&B&RSV AMP PRB: CPT | Performed by: NURSE PRACTITIONER

## 2025-03-26 PROCEDURE — 87081 CULTURE SCREEN ONLY: CPT | Performed by: NURSE PRACTITIONER

## 2025-03-26 PROCEDURE — 3078F DIAST BP <80 MM HG: CPT | Performed by: NURSE PRACTITIONER

## 2025-03-26 PROCEDURE — 87880 STREP A ASSAY W/OPTIC: CPT | Performed by: NURSE PRACTITIONER

## 2025-03-26 PROCEDURE — 99213 OFFICE O/P EST LOW 20 MIN: CPT | Performed by: NURSE PRACTITIONER

## 2025-03-26 PROCEDURE — 3075F SYST BP GE 130 - 139MM HG: CPT | Performed by: NURSE PRACTITIONER

## 2025-03-26 NOTE — H&P
[facility] [address]    History and Physical    Daja Andino Patient Status:  No patient class for patient encounter    10/5/1975 MRN [id number]   Location [facility], [facility], [address] [place] [address] Attending No att. providers found   Hosp Day # 0 PCP Manan Lynn MD     Date:  3/26/2025  Date of Admission:  (Not on file)    History provided by:patient  HPI:     Chief Complaint   Patient presents with    Sore Throat     Headache, body aches, feeling cold, sinus congestion  - Entered by patient, x yesterday      HPI    History     Past Medical History:    Abdominal pain    Acute, but ill-defined, cerebrovascular disease    Father    Allergic rhinitis    Asthma (HCC)    Back pain    Belching    Bloating    Blurred vision    Chest pain    Deviated nasal septum    Esophageal reflux    Hay fever    Heartburn    High cholesterol    Hyperlipidemia    Indigestion    Irregular bowel habits    After gallbladder removed    Itch of skin    Lumbar disc disease    Pain in joints    Rash    Visual impairment    reading glasses    Wears glasses     Past Surgical History:   Procedure Laterality Date    Cholecystectomy      Colonoscopy      D & c      Egd            Other  2020    Removal of uterine polyps with Dr Sheehan    Other surgical history      Uterine Polypectomy    Pain management  2021    right SI Joint injections done under fluoroscopic guidance with contrast enhancement.    Paragard, iud  2020     Family History   Problem Relation Age of Onset    Hypertension Father     Stroke Father             Other (Other) Father         CVA    Alcohol and Other Disorders Associated Mother     Diabetes Mother     Hypertension Mother     Diabetes Maternal Grandfather      Social History:  Social History     Socioeconomic History    Marital status:    Tobacco Use    Smoking  status: Never    Smokeless tobacco: Never   Vaping Use    Vaping status: Never Used   Substance and Sexual Activity    Alcohol use: Yes     Alcohol/week: 1.0 standard drink of alcohol     Types: 1 Standard drinks or equivalent per week     Comment: Once a week    Drug use: No   Other Topics Concern    Caffeine Concern No    Exercise Yes    Seat Belt No    Special Diet No    Stress Concern No    Weight Concern No   Social History Narrative    The patient does not use an assistive device..      The patient does live in a home with stairs.     Allergies/Medications:   Allergies: Allergies[1]  (Not in a hospital admission)      Review of Systems:   Review of Systems    Physical Exam:   Vital Signs:  Blood pressure 132/68, pulse 57, temperature 98.4 °F (36.9 °C), temperature source Oral, resp. rate 16, weight 152 lb (68.9 kg), last menstrual period 03/22/2025, SpO2 98%, not currently breastfeeding.  Physical Exam  Cervical Papanicolaou {Cervical Pap:5397}    Results:     Lab Results   Component Value Date    WBC 7.0 02/25/2025    HGB 14.1 02/25/2025    HCT 44.0 02/25/2025     02/25/2025    CREATSERUM 0.61 02/25/2025    BUN 13 02/25/2025     02/25/2025    K 4.2 02/25/2025     02/25/2025    CO2 25 02/25/2025    GLU 93 02/25/2025    CA 9.1 02/25/2025    ALB 4.4 02/25/2025    ALKPHO 54 02/25/2025    BILT 0.5 02/25/2025    TP 7.1 02/25/2025    AST 17 02/25/2025    ALT 19 02/25/2025    T4F 1.2 12/12/2014    TSH 2.02 02/25/2025     07/07/2018     No results found.        Assessment/Plan:     * No active hospital problems. *      [unfilled]      [place] MARIKA Guajardo, APRN  3/26/2025         [1]   Allergies  Allergen Reactions    Tetracycline PAIN     abdominal    Bactrim [Sulfamethoxazole W/Trimethoprim] RASH

## 2025-03-26 NOTE — PATIENT INSTRUCTIONS
1. Gargle throat with 1 tsp (5 g) of salt dissolved in 8 fl oz (240 mL) of warm water.  You may also drink warm broth/soup, or tea with honey  2. Drink plenty of fluids and get plenty of rest.   3. You may use throat lozenges, acetaminophen, or ibuprofen for pain and fever.    4. Continue Using an OTC antihistamine such as once daily Zyrtec or Claritin (choose one) may help to dry any postnasal drip and decrease irritation to your throat. Take as directed.  5. START Flonase nasal spray daily. 1 spray in each nostril for congestion from allergies and cold symptoms.  6. Follow up with primary care physician in 1-2 weeks or sooner if needed.  7. Seek medical attention sooner for worsening of symptoms despite treatment efforts, or the emergency room for the following non inclusive list of symptoms: uncontrolled fever/pain, inability to keep fluids down, shortness of breath or respiratory distress.  8. We will notify you of throat culture results in the next few days

## 2025-03-26 NOTE — PROGRESS NOTES
Subjective:   Patient ID: Daja Andino is a 49 year old female.    Daja presented today with symptoms of sore throat, HA, fatigue, body aches, and chills x 1 day. She had one episode of vomiting yesterday. She denies  fever, cough, n/d, rash, decreased appetite, ear pain, SOB, trouble breathing, and trouble sleeping. She reports mild runny nose which is consistent with environmental allergies for which she takes a daily Zyrtec. Her daughter has been sick at home. She has taken Tylenol at home for symptom relief.        History/Other:   Review of Systems   Constitutional:  Positive for chills and fatigue. Negative for appetite change and fever.   HENT:  Positive for rhinorrhea and sore throat. Negative for congestion and ear pain.    Respiratory:  Negative for cough and shortness of breath.    Gastrointestinal:  Positive for vomiting. Negative for abdominal pain, diarrhea and nausea.   Musculoskeletal:  Positive for myalgias.   Skin:  Negative for rash.   Allergic/Immunologic: Positive for environmental allergies.   Neurological:  Positive for headaches.     Current Outpatient Medications   Medication Sig Dispense Refill    FAMOTIDINE 20 MG Oral Tab TAKE 1 TABLET BY MOUTH EVERY DAY 90 tablet 1    atorvastatin 10 MG Oral Tab Take 1 tablet (10 mg total) by mouth every morning. 90 tablet 3    gabapentin 100 MG Oral Cap Take 2 capsules (200 mg total) by mouth As Directed. Take 1 capsule (100MG total) by mouth in the morning. Take 2 capsules (200MG total) by mouth at bedtime. 270 capsule 0    Cetirizine HCl (ZYRTEC ALLERGY) 10 MG Oral Cap Take 20 mg by mouth 2 (two) times a day. 30 capsule 0    Vitamin D3 2000 UNITS Oral Cap Take 1 capsule (2,000 Units total) by mouth daily.      GLUCOSA-CHONDR-NA CHONDR-MSM OR  (Patient not taking: Reported on 3/26/2025)       Allergies:Allergies[1]    /68   Pulse 57   Temp 98.4 °F (36.9 °C) (Oral)   Resp 16   Wt 152 lb (68.9 kg)   LMP 03/22/2025 (Approximate)    SpO2 98%   BMI 27.80 kg/m²     Objective:   Physical Exam  Vitals reviewed.   Constitutional:       General: She is awake. She is not in acute distress.     Appearance: Normal appearance. She is well-developed and well-groomed. She is not ill-appearing, toxic-appearing or diaphoretic.   HENT:      Head: Normocephalic and atraumatic.      Right Ear: Hearing, ear canal and external ear normal. A middle ear effusion is present.      Left Ear: Hearing, ear canal and external ear normal. A middle ear effusion is present.      Nose: Nose normal. No congestion or rhinorrhea.      Right Turbinates: Not enlarged, swollen or pale.      Left Turbinates: Not enlarged, swollen or pale.      Mouth/Throat:      Lips: Pink.      Mouth: Mucous membranes are moist.      Pharynx: Uvula midline. Oropharyngeal exudate, posterior oropharyngeal erythema and postnasal drip present.      Tonsils: Tonsillar exudate present. No tonsillar abscesses. 1+ on the right. 1+ on the left.   Cardiovascular:      Rate and Rhythm: Normal rate and regular rhythm.      Heart sounds: Normal heart sounds, S1 normal and S2 normal.   Pulmonary:      Effort: Pulmonary effort is normal. No tachypnea.      Breath sounds: Normal breath sounds and air entry. No decreased air movement. No decreased breath sounds, wheezing, rhonchi or rales.   Lymphadenopathy:      Head:      Right side of head: Tonsillar adenopathy present. No submental, submandibular or occipital adenopathy.      Left side of head: Tonsillar adenopathy present. No submental, submandibular or occipital adenopathy.   Skin:     General: Skin is warm and dry.   Neurological:      Mental Status: She is alert and oriented to person, place, and time.   Psychiatric:         Attention and Perception: Attention and perception normal.         Mood and Affect: Mood and affect normal.         Speech: Speech normal.         Behavior: Behavior normal. Behavior is cooperative.         Thought Content: Thought  content normal.         Cognition and Memory: Cognition and memory normal.         Judgment: Judgment normal.         Assessment & Plan:   1. Sore throat    2. Body aches    3. Chills (without fever)        Orders Placed This Encounter   Procedures    Strep A Assay W/Optic    Grp A Strep Cult, Throat [E]    SARS-CoV-2/Flu A and B/RSV by PCR ([facility])     Results for orders placed or performed in visit on 03/26/25   Strep A Assay W/Optic    Collection Time: 03/26/25 10:58 AM   Result Value Ref Range    Strep Grp A Screen negative Negative    Control Line Present with a clear background (yes/no) yes Yes/No    Kit Lot # [id number] Numeric    Kit Expiration Date 12,202,025 Date       Meds This Visit:  Requested Prescriptions      No prescriptions requested or ordered in this encounter     Reviewed POC test results with patient.  Sent Strep Culture based on symptoms. Will follow up with results.   Reassuring physical exam findings. Vitals WNL. No sign of RDS or dehydration at this time.  Supportive care and return to care measures reviewed.  Patient v/u and is comfortable with this plan.    Patient Instructions   1. Gargle throat with 1 tsp (5 g) of salt dissolved in 8 fl oz (240 mL) of warm water.  You may also drink warm broth/soup, or tea with honey  2. Drink plenty of fluids and get plenty of rest.   3. You may use throat lozenges, acetaminophen, or ibuprofen for pain and fever.    4. Continue Using an OTC antihistamine such as once daily Zyrtec or Claritin (choose one) may help to dry any postnasal drip and decrease irritation to your throat. Take as directed.  5. START Flonase nasal spray daily. 1 spray in each nostril for congestion from allergies and cold symptoms.  6. Follow up with primary care physician in 1-2 weeks or sooner if needed.  7. Seek medical attention sooner for worsening of symptoms despite treatment efforts, or the emergency room for the following non inclusive list of symptoms: uncontrolled  fever/pain, inability to keep fluids down, shortness of breath or respiratory distress.  8. We will notify you of throat culture results in the next few days       CORRINE Carlson student  I certify that I have supervised the student in his/her medical examination and care of this patient. The above documentation was carefully reviewed by me.  CORRINE Jimenez         [1]   Allergies  Allergen Reactions    Tetracycline PAIN     abdominal    Bactrim [Sulfamethoxazole W/Trimethoprim] RASH

## 2025-03-27 LAB
FLUAV + FLUBV RNA SPEC NAA+PROBE: NOT DETECTED
FLUAV + FLUBV RNA SPEC NAA+PROBE: NOT DETECTED
RSV RNA SPEC NAA+PROBE: NOT DETECTED
SARS-COV-2 RNA RESP QL NAA+PROBE: NOT DETECTED

## 2025-03-29 ENCOUNTER — TELEPHONE (OUTPATIENT)
Dept: FAMILY MEDICINE CLINIC | Facility: CLINIC | Age: 50
End: 2025-03-29

## 2025-03-29 DIAGNOSIS — J06.9 VIRAL URI WITH COUGH: Primary | ICD-10-CM

## 2025-03-29 RX ORDER — BENZONATATE 200 MG/1
200 CAPSULE ORAL 3 TIMES DAILY PRN
Qty: 30 CAPSULE | Refills: 0 | Status: SHIPPED | OUTPATIENT
Start: 2025-03-29

## 2025-03-29 NOTE — TELEPHONE ENCOUNTER
Encounter Diagnosis   Name Primary?    Viral URI with cough Yes     Patient seen in [facility] on 03/26/2025 and had negative strep and QUAD testing. Patient is requesting cough suppressant Rx to be sent to pharmacy due to cough. Benzonatate sent to pharmacy on file. Medication as listed below. Patient to follow up with PCP.    Meds & Refills for this Visit:  Requested Prescriptions     Signed Prescriptions Disp Refills    benzonatate 200 MG Oral Cap 30 capsule 0     Sig: Take 1 capsule (200 mg total) by mouth 3 (three) times daily as needed for cough.     Authorizing Provider: ADEN CADENA

## 2025-04-14 NOTE — PROGRESS NOTES
I had a telephone encounter with Ms. Reji Rina Lazo. She called stating that she had severe hip pain after work last night. She was tempted to visit the ER but managed her symptoms with naproxen.  She describes being diagnosed with hip bursitis and physical No care due was identified.  [facility] Embedded Care Due Messages. Reference number: [id number].   4/14/2025 2:39:10 PM CDT

## 2025-05-06 DIAGNOSIS — E78.00 PURE HYPERCHOLESTEROLEMIA: Chronic | ICD-10-CM

## 2025-05-06 RX ORDER — ATORVASTATIN CALCIUM 10 MG/1
10 TABLET, FILM COATED ORAL EVERY MORNING
Qty: 90 TABLET | Refills: 3 | Status: SHIPPED | OUTPATIENT
Start: 2025-05-06

## 2025-05-06 NOTE — TELEPHONE ENCOUNTER
atorvastatin 10 MG Oral Tab         Sig: Take 1 tablet (10 mg total) by mouth every morning.    Disp: 90 tablet    Refills: 3    Start: 5/6/2025    Class: Normal    Non-formulary For: Pure hypercholesterolemia    Last ordered: 2 months ago (3/4/2025) by Manan Lynn MD    Cholesterol Medication Protocol Wynykg3805/06/2025 09:51 AM   Protocol Details ALT < 80    ALT resulted within past year    Lipid panel within past 12 months    In person appointment or virtual visit in the past 12 mos or appointment in next 3 mos    Medication is active on med list      To be [facility] at: [id number] [place] [place] [facility] [address] [facility] [phone], [phone]

## 2025-06-25 DIAGNOSIS — M54.16 LEFT LUMBAR RADICULITIS: ICD-10-CM

## 2025-06-26 ENCOUNTER — PATIENT MESSAGE (OUTPATIENT)
Dept: PHYSICAL MEDICINE AND REHAB | Facility: CLINIC | Age: 50
End: 2025-06-26

## 2025-06-26 RX ORDER — GABAPENTIN 100 MG/1
200 CAPSULE ORAL AS DIRECTED
Qty: 270 CAPSULE | Refills: 0 | OUTPATIENT
Start: 2025-06-26

## 2025-06-26 NOTE — TELEPHONE ENCOUNTER
Refill Request    Medication request: gabapentin 100 MG Oral Cap. Take 2 capsules (200 mg total) by mouth As Directed. Take 1 capsule (100MG total) by mouth in the morning. Take 2 capsules (200MG total) by mouth at bedtime.      LOV:2/1/2024 with Dr Jimenez  Due back to clinic per last office note:  Return if symptoms worsen or fail to improve.   NOV: Visit date not found      [place] 2/19/25 #270 (90 days)    Urine drug screen (if applicable): N/A  Pain contract: N/A    LOV plan (if weaning or changing medications): Increase the gabapentin to 100mg in the morning, 200mg at night time. When the flare subsides you may resume 200mg at night time.       Refill denied. Appointment is needed.

## 2025-06-26 NOTE — TELEPHONE ENCOUNTER
Previous refill denied d/t appt required. Patient was made aware of appointment requirement in Feb 2025 refill request. See below for message sent to the patient.     Good morning,      Your refill request has been routed to your doctor. Since your last visit was on 2/1/24, you are due for a yearly follow up. In order to continue receiving refills on your medications, please schedule a follow up appointment through [facility] or by calling our front office at [phone], option #2 & option #1. Our phone hours are M-Th: 8AM-4PM and F:8AM-1PM.       Have a great day,      Artie STEVENS  [facility]                   Last read by Daja Andino at 6:07PM on 2/28/2025.

## 2025-07-22 ENCOUNTER — OFFICE VISIT (OUTPATIENT)
Dept: PHYSICAL MEDICINE AND REHAB | Facility: CLINIC | Age: 50
End: 2025-07-22
Payer: COMMERCIAL

## 2025-07-22 ENCOUNTER — TELEPHONE (OUTPATIENT)
Dept: PHYSICAL MEDICINE AND REHAB | Facility: CLINIC | Age: 50
End: 2025-07-22

## 2025-07-22 VITALS — BODY MASS INDEX: 27.97 KG/M2 | HEIGHT: 62 IN | WEIGHT: 152 LBS

## 2025-07-22 DIAGNOSIS — M53.3 PAIN OF RIGHT SACROILIAC JOINT: Primary | ICD-10-CM

## 2025-07-22 PROCEDURE — 3008F BODY MASS INDEX DOCD: CPT | Performed by: PHYSICAL MEDICINE & REHABILITATION

## 2025-07-22 PROCEDURE — 99214 OFFICE O/P EST MOD 30 MIN: CPT | Performed by: PHYSICAL MEDICINE & REHABILITATION

## 2025-07-22 NOTE — PROGRESS NOTES
The following individual(s) verbally consented to be recorded using ambient AI listening technology and understand that they can each withdraw their consent to this listening technology at any point by asking the clinician to turn off or pause the recording:    Patient name: Daja Andino  Additional names:  Carlos A Andino

## 2025-07-22 NOTE — TELEPHONE ENCOUNTER
Patient has been scheduled for Right Sacroiliac Joint Injection on 9/15/25 at the [facility] with Dr. Jimenez.   Anesthesia type:  Local  Please note: The [facility] will call the business day prior to discuss the exact time/arrival and additional instructions for your appointment.  Patient was advised that if he/she does receive the covid vaccine it needs to be at least 2 weeks before or after the injection.  Medications and allergies reviewed.  Patient informed of [facility]'s  policy:  The patient will require transportation arrangements to and from the procedure, with the  present on site for the entire visit.  Without a , the appointment is subject to cancellation.    [facility] is located in the [facility] 1st floor [address], [place] [address].   may park in the yellow/purple parking lot.  Patient verbalized understanding and agrees with plan.  Scheduled in Epic: Yes  Scheduled in Surgical Case: Yes  Follow up appointment made: NOV: Visit date not found  Authorization date valid until 10/22/25 at [facility].

## 2025-07-22 NOTE — TELEPHONE ENCOUNTER
Initiated authorization for Right sacroiliac joint steroid injection under fluoroscopy. CPT/HCPCS 55498 dx:M53.3 to be done at [facility] with [employer] portal.    Status: No Action Required  Reference/Authorization # [id number]  Transaction ID: [id number]  Valid: 7/22/25-10/22/25  Authorization is not required based on medical necessity, however, is not a guarantee of payment and may be subject to review once claim is submitted.     PLEASE INFORM THE PATIENT TO CONTACT THE OFFICE IF THE INSURANCE CHANGES AS HE/SHE IS RESPONSIBLE TO UPDATE THE OFFICE IF INSURANCE CHANGES SO THAT PROCEDURE IS BILLED CORRECTLY.

## 2025-07-22 NOTE — PROGRESS NOTES
The following individual(s) verbally consented to be recorded using ambient AI listening technology and understand that they can each withdraw their consent to this listening technology at any point by asking the clinician to turn off or pause the recording:    Patient name: Daja Andino     Progress note    C/C:   Chief Complaint   Patient presents with    Low Back Pain     LOV 02/01/24 Pt presents today with back and hip pain. Pt was taking Gabapentin and she is not sure if it helped her symptoms or not.  Pt states her pain is a 6/10 today. Pt consent to abridge        History of Present Illness  Daja Andino is a 49 year old female who presents with knee and right back/buttock pain.    Knee pain has been present for three months, intermittent, and worsens with bending. It hinders kneeling and sitting low. Previous x-rays showed mild findings. Occasional night cramps occur without numbness or tingling in the legs or feet.    Back and hip pain is localized to the right upper buttock area just inferior to the right PSIS, and aggravated most by bending.     She takes gabapentin, recently reduced from two 100 mg tablets to one due to running out. Glucosamine and chondroitin sulfate were discontinued due to stomach upset.    No recent fevers, chills, unintended weight loss, or night sweats. She works in dialysis and enjoys hiking and camping, using a walking stick for mobility.    Past Medical History:  No date: Abdominal pain  No date: Acute, but ill-defined, cerebrovascular disease      Comment:  Father  2000: Allergic rhinitis  No date: Asthma (HCC)  No date: Back pain  No date: Belching  No date: Bloating  No date: Blurred vision  No date: Chest pain  No date: Deviated nasal septum  No date: Esophageal reflux  No date: Hay fever  No date: Heartburn  No date: High cholesterol  No date: Hyperlipidemia  No date: Indigestion  No date: Irregular bowel habits      Comment:  After gallbladder  removed  No date: Itch of skin  No date: Lumbar disc disease  No date: Pain in joints  No date: Rash  No date: Visual impairment      Comment:  reading glasses  No date: Wears glasses     Current Medications[1]     Allergies  Review status set to Review Complete by Siobhan Fair MA on 7/22/2025        Severity Reactions Comments    Tetracycline Not Specified PAIN abdominal    Bactrim [sulfamethoxazole W/trimethoprim] Low RASH             Physical exam:  Ht 62\"   Wt 152 lb (68.9 kg)   LMP 03/22/2025 (Approximate)   BMI 27.80 kg/m²      Lumbar spine exam:    No skin rash lumbar/upper sacral region  + pain with lumbar flexion. + pain with lumbar extension. Flexion worse than extension.  No tenderness to palpation right lumbar paraspinals. + ttp right PSIS and inferior to it.  5/5 LE strength b/l in HF, KE, ADF, EHL, ankle eversion  SILT b/l LE  2/4 quad, gastrocs reflexes b/l  SLR - right  KATHERINE test + for right SIJ pain    right knee exam  Observation: no palpable effusion    Range of motion:  Flexion: 120 degrees  Extension: 0 degrees    Palpation:  Medial joint line tenderness: -  Lateral joint line tenderness: -  Medial patellar facet tenderness: -  Lateral patellar facet tenderness: -    Provocative tests:  Valgus and varus stress testing: -    IMAGING: No new imaging to review  Assessment & Plan  Rght sacroiliac joint pain    Rght sacroiliac joint pain persists despite previous ineffective gabapentin, previous physical therapy.Stop gabapentin. Recommend right sacroiliac joint steroid injection under fluoroscopy. We discussed the risks of procedure, including bleeding, infection, nerve injury, HA; elects to proceed.    Intermittent knee pain    Intermittent knee pain presents with quite mild XR findings of osteoarthritis, without significant range of motion issues. Advise using a walking stick for support during activities. Not overly inhibiting her activity at the moment. Could consider PT for this in the  future. In the meantime OTC NSAIDs PRN.     Dany Jimenez DO  Physical Medicine and Rehabilitation  [facility]         [1]    atorvastatin 10 MG Oral Tab Take 1 tablet (10 mg total) by mouth every morning. 90 tablet 3    FAMOTIDINE 20 MG Oral Tab TAKE 1 TABLET BY MOUTH EVERY DAY 90 tablet 1    Cetirizine HCl (ZYRTEC ALLERGY) 10 MG Oral Cap Take 20 mg by mouth 2 (two) times a day. 30 capsule 0    Vitamin D3 2000 UNITS Oral Cap Take 1 capsule (2,000 Units total) by mouth daily.

## (undated) DIAGNOSIS — N39.0 URINARY TRACT INFECTION WITHOUT HEMATURIA, SITE UNSPECIFIED: Primary | ICD-10-CM

## (undated) DIAGNOSIS — E78.00 PURE HYPERCHOLESTEROLEMIA: ICD-10-CM

## (undated) DIAGNOSIS — Z00.00 ROUTINE GENERAL MEDICAL EXAMINATION AT A HEALTH CARE FACILITY: Primary | ICD-10-CM

## (undated) DEVICE — STERILE POLYISOPRENE POWDER-FREE SURGICAL GLOVES: Brand: PROTEXIS

## (undated) DEVICE — SOL  .9 1000ML BTL

## (undated) DEVICE — TROCAR: Brand: KII FIOS FIRST ENTRY

## (undated) DEVICE — SUTURE MONOCRYL 4-0 PS-2

## (undated) DEVICE — MONOFILAMENT ABSORBABLE SUTURE: Brand: MAXON

## (undated) DEVICE — Device

## (undated) DEVICE — DRAPE C-ARM UNIVERSAL

## (undated) DEVICE — ZIPWIRE GUIDEWIRE .035X150 STR

## (undated) DEVICE — INSUFFLATION NEEDLE TO ESTABLISH PNEUMOPERITONEUM.: Brand: INSUFFLATION NEEDLE

## (undated) DEVICE — CHLORAPREP 26ML APPLICATOR

## (undated) DEVICE — OPEN-END FLEXI-TIP URETERAL CATHETER: Brand: FLEXI-TIP

## (undated) DEVICE — KENDALL SCD EXPRESS SLEEVES, KNEE LENGTH, MEDIUM: Brand: KENDALL SCD

## (undated) DEVICE — LIGAMAX 5 MM ENDOSCOPIC MULTIPLE CLIP APPLIER: Brand: LIGAMAX

## (undated) DEVICE — LAP CHOLE/APPY CDS-LF: Brand: MEDLINE INDUSTRIES, INC.

## (undated) DEVICE — TROCAR: Brand: KII® SLEEVE

## (undated) DEVICE — CAUTERY PENCIL

## (undated) DEVICE — TROCAR: Brand: KII SHIELDED BLADED ACCESS SYSTEM

## (undated) DEVICE — VISUALIZATION SYSTEM: Brand: CLEARIFY

## (undated) NOTE — LETTER
[facility]   Date:   2/1/2024     Name:   Daja Andino    YOB: 1975   MRN:   [id number]       WHERE IS YOUR PAIN NOW?  Maxwell the areas on your body where you feel the described sensations.  Use the appropriate symbol.  Maxwell the areas of radiation.  Include all affected areas.  Just to complete the picture, please draw in the face.     ACHE:  ^ ^ ^   NUMBNESS:  0000   PINS & NEEDLES:  = = = =                              ^ ^ ^                       0000              = = = =                                    ^ ^ ^                       0000            = = = =      BURNING:  XXXX   STABBING: ////                  XXXX                ////                         XXXX          ////     Please maxwell the line below indicating your degree of pain right now  with 0 being no pain 10 being the worst pain possible.                                         0             1             2              3             4              5              6              7             8             9             10         Patient Signature:

## (undated) NOTE — LETTER
[address]   Date:   5/27/2021     Name:   Alan Guevara    YOB: 1975   MRN:   [id number]       [facility]? Nestor the areas on your body where you feel the described sensations.   Use the approp

## (undated) NOTE — MR AVS SNAPSHOT
[place]  [address]  [address] [facility] [address] [phone]               Thank you for choosing us for your health care visit with Con Morton MD.  We are glad to serve you and happy to provide you with this ruelas CBC W Differential W Platelet    Complete by:  As directed        Hepatic Function Panel (7)    Complete by:  As directed              Follow-up Instructions     Return in about 6 months (around 7/3/2017).          Referral Information     Referral Order R

## (undated) NOTE — LETTER
03/24/20    Re: [place] Matt Mascot      To Whom It May Concern:      This letter has been written at the patient's request. In light of the COVID-19 pandemic and in conjunction with the CDC's recommendations to reduce the exposure and risk of infection i

## (undated) NOTE — LETTER
[address] [place] [address]   Date:   8/31/2022     Name:   Fabio Mcclelland    YOB: 1975   MRN:   [id number]       [facility]? Maxwell the areas on your body where you feel the described sensations. Use the appropriate symbol. Read Celestine the areas of radiation. Include all affected areas. Just to complete the picture, please draw in the face. ACHE:  ^ ^ ^   NUMBNESS:  0000   PINS & NEEDLES:  = = = =                              ^ ^ ^                       0000              = = = =                                    ^ ^ ^                       0000            = = = =      BURNING:  XXXX   STABBING: ////                  XXXX                ////                         XXXX          ////     Please maxwell the line below indicating your degree of pain right now  with 0 being no pain 10 being the worst pain possible.                                          0             1             2              3             4              5              6              7             8             9             10         Patient Signature:

## (undated) NOTE — LETTER
Dear Employer,    This letter has been written at the patient's request. Due to the COVID-19 pandemic and in conjunction with the CDC's recommendations to reduce the exposure and risk of infection in patients with underlying medical conditions.  It is my me

## (undated) NOTE — LETTER
[address], [facility]   Date:   1/11/2023     Name:   Stiven Monae    YOB: 1975   MRN:   [id number]       [facility]? Nestor the areas on your body where you feel the described sensations. Use the appropriate symbol. Sindy Tay the areas of radiation. Include all affected areas. Just to complete the picture, please draw in the face. ACHE:  ^ ^ ^   NUMBNESS:  0000   PINS & NEEDLES:  = = = =                              ^ ^ ^                       0000              = = = =                                    ^ ^ ^                       0000            = = = =      BURNING:  XXXX   STABBING: ////                  XXXX                ////                         XXXX          ////     Please nestor the line below indicating your degree of pain right now  with 0 being no pain 10 being the worst pain possible.                                          0             1             2              3             4              5              6              7             8             9             10         Patient Signature:

## (undated) NOTE — LETTER
[address] [facility] [place]   Date:   7/16/2021     Name:   Gaetano Rivera    YOB: 1975   MRN:   [id number]       [facility]? Nestor the areas on your body where you feel the described sensations.

## (undated) NOTE — LETTER
Date & Time: 1/8/2024, 10:45 AM  Patient: Daja Andino  Encounter Provider(s):    Linda Reed PA-C       To Whom It May Concern:    Daja Andino was seen and treated in our department on 1/8/2024. She should not return to work until symptom and fever free for 24 hours .    If you have any questions or concerns, please do not hesitate to call.        _____________________________  Physician/APC Signature

## (undated) NOTE — LETTER
22      Daja Mendoza [facility] julia  :  10/5/1975      To Whom It May Concern: This patient was seen in our office on 22. Should be excused for time off to attend this appointment. If this office may be of further assistance, please do not hesitate to contact us.       Sincerely,        Danial Oliva, DO

## (undated) NOTE — LETTER
Date & Time: 2/13/2022, 10:49 AM  Patient: Gabriela Garvin  Encounter Provider(s):    Genoveva Cobb       To Whom It May Concern:    Gabriela Garvin was seen and treated in our department on 2/13/2022.   Return to work once rash resolves  If you have any questions or concerns, please do not hesitate to call.        _____________________________  Physician/APC Signature

## (undated) NOTE — LETTER
Date & Time: 7/2/2023, 10:39 AM  Patient: Maggi Mendoza [id number]  Encounter Provider(s):    Rickey Singer MD       To Whom It May Concern:    Glendy Jane was seen and treated in our department on 7/2/2023. She should not return to work until Tuesday, July 4, 2023 .     If you have any questions or concerns, please do not hesitate to call.        _____________________________  Physician/APC Signature

## (undated) NOTE — LETTER
Date: 5/10/2021    Patient Name: Annie Molina          To Whom it may concern: The patient may be excused from work for 1 day 5/10/21. Patient is scheduled to follow up in office on 5/13/21.         Sincerely,    Veto Menjivar MD

## (undated) NOTE — LETTER
05/13/21    Date:     Patient Name: Tone Ellis        To Whom it may concern: This letter has been written at the patient's request. The above patient was seen at the [facility] for treatment of a medical condition.     This pa

## (undated) NOTE — LETTER
[address] [place] [address]   Date:   3/4/2022     Name:   Gretchen Miller    YOB: 1975   MRN:   [id number]       [facility]? Nestor the areas on your body where you feel the described sensations. Use the appropriate symbol. Kamar Moscoso the areas of radiation. Include all affected areas. Just to complete the picture, please draw in the face. ACHE:  ^ ^ ^   NUMBNESS:  0000   PINS & NEEDLES:  = = = =                              ^ ^ ^                       0000              = = = =                                    ^ ^ ^                       0000            = = = =      BURNING:  XXXX   STABBING: ////                  XXXX                ////                         XXXX          ////     Please nestor the line below indicating your degree of pain right now  with 0 being no pain 10 being the worst pain possible.                                          0             1             2              3             4              5              6              7             8             9             10         Patient Signature:

## (undated) NOTE — Clinical Note
02/02/2017        [address] [place] [address]      Dear Gladis Sharmaco,    [address] records indicate that you have outstanding lab work and or testing that was ordered for you and has not yet been completed:      CORNELIUS Rose

## (undated) NOTE — Clinical Note
I had the pleasure of seeing Ms. Zack Rey in my office today. Please see my attached note.       Suze Cortes

## (undated) NOTE — LETTER
WHERE IS YOUR PAIN NOW?  Maxwell the areas on your body where you feel the described sensations.  Use the appropriate symbol.  Maxwell the areas of radiation.  Include all affected areas.  Just to complete the picture, please draw in the face.     ACHE:  ^ ^ ^   NUMBNESS:  0000   PINS & NEEDLES:  = = = =                              ^ ^ ^                       0000              = = = =                                    ^ ^ ^                       0000            = = = =      BURNING:  XXXX   STABBING: ////                  XXXX                ////                         XXXX          ////     Please maxwell the line below indicating your degree of pain right now  with 0 being no pain 10 being the worst pain possible.                                         0             1             2              3             4              5              6              7             8             9             10         Patient Signature:

## (undated) NOTE — LETTER
Date: 3/26/2025    Patient Name: Daja Andino          To Whom it may concern:    Daja Andino was seen in my clinic today. She may return to work when she is fever free for 24 hours and symptoms are improving. Please excuse her for days missed.        Sincerely,    CORRINE Jimenez APRN

## (undated) NOTE — LETTER
[address]   Date:   6/10/2021     Name:   Lluvia Chapman    YOB: 1975   MRN:   [id number]       [facility]? Nestor the areas on your body where you feel the described sensations.   Use the approp

## (undated) NOTE — LETTER
23      Daja Mendoza [place] julia  :  10/5/1975      To Whom It May Concern: This patient was seen in our office on 23. She should be excused from work this week, may return to work on 2023. If this office may be of further assistance, please do not hesitate to contact us.       Sincerely,        Shawn Montemayor, DO